# Patient Record
Sex: MALE | Race: WHITE | NOT HISPANIC OR LATINO | Employment: UNEMPLOYED | URBAN - METROPOLITAN AREA
[De-identification: names, ages, dates, MRNs, and addresses within clinical notes are randomized per-mention and may not be internally consistent; named-entity substitution may affect disease eponyms.]

---

## 2020-10-20 ENCOUNTER — TELEPHONE (OUTPATIENT)
Facility: CLINIC | Age: 35
End: 2020-10-20

## 2020-10-20 ENCOUNTER — HOSPITAL ENCOUNTER (INPATIENT)
Facility: CLINIC | Age: 35
LOS: 3 days | Discharge: HOME | End: 2020-10-23
Attending: INTERNAL MEDICINE | Admitting: INTERNAL MEDICINE
Payer: COMMERCIAL

## 2020-10-20 ENCOUNTER — APPOINTMENT (OUTPATIENT)
Dept: GENERAL RADIOLOGY | Facility: CLINIC | Age: 35
End: 2020-10-20
Attending: INTERNAL MEDICINE
Payer: COMMERCIAL

## 2020-10-20 ENCOUNTER — TRANSFERRED RECORDS (OUTPATIENT)
Dept: HEALTH INFORMATION MANAGEMENT | Facility: CLINIC | Age: 35
End: 2020-10-20

## 2020-10-20 PROBLEM — F10.939 ALCOHOL WITHDRAWAL (H): Status: ACTIVE | Noted: 2020-10-20

## 2020-10-20 LAB
ALT SERPL-CCNC: 32 U/L (ref 0–45)
AST SERPL-CCNC: 47 U/L (ref 0–40)
CREAT SERPL-MCNC: 1.31 MG/DL (ref 0.7–1.3)
GFR SERPL CREATININE-BSD FRML MDRD: >60 ML/MIN/1.73M2
GLUCOSE SERPL-MCNC: 152 MG/DL (ref 70–125)
POTASSIUM SERPL-SCNC: 2.5 MMOL/L (ref 3.5–5)

## 2020-10-20 PROCEDURE — 80053 COMPREHEN METABOLIC PANEL: CPT | Performed by: INTERNAL MEDICINE

## 2020-10-20 PROCEDURE — 71045 X-RAY EXAM CHEST 1 VIEW: CPT

## 2020-10-20 PROCEDURE — HZ2ZZZZ DETOXIFICATION SERVICES FOR SUBSTANCE ABUSE TREATMENT: ICD-10-PCS | Performed by: INTERNAL MEDICINE

## 2020-10-20 PROCEDURE — 83735 ASSAY OF MAGNESIUM: CPT | Performed by: INTERNAL MEDICINE

## 2020-10-20 PROCEDURE — 93010 ELECTROCARDIOGRAM REPORT: CPT | Performed by: INTERNAL MEDICINE

## 2020-10-20 PROCEDURE — 36415 COLL VENOUS BLD VENIPUNCTURE: CPT | Performed by: INTERNAL MEDICINE

## 2020-10-20 PROCEDURE — 99223 1ST HOSP IP/OBS HIGH 75: CPT | Mod: AI | Performed by: INTERNAL MEDICINE

## 2020-10-20 PROCEDURE — 999N000157 HC STATISTIC RCP TIME EA 10 MIN

## 2020-10-20 PROCEDURE — 93005 ELECTROCARDIOGRAM TRACING: CPT

## 2020-10-20 PROCEDURE — 120N000001 HC R&B MED SURG/OB

## 2020-10-20 RX ORDER — HALOPERIDOL 5 MG/ML
2.5-5 INJECTION INTRAMUSCULAR EVERY 6 HOURS PRN
Status: DISCONTINUED | OUTPATIENT
Start: 2020-10-20 | End: 2020-10-23 | Stop reason: HOSPADM

## 2020-10-20 RX ORDER — LIDOCAINE 40 MG/G
CREAM TOPICAL
Status: DISCONTINUED | OUTPATIENT
Start: 2020-10-20 | End: 2020-10-23 | Stop reason: HOSPADM

## 2020-10-20 RX ORDER — LORAZEPAM 1 MG/1
1-2 TABLET ORAL EVERY 30 MIN PRN
Status: DISCONTINUED | OUTPATIENT
Start: 2020-10-20 | End: 2020-10-23 | Stop reason: HOSPADM

## 2020-10-20 RX ORDER — LANOLIN ALCOHOL/MO/W.PET/CERES
100 CREAM (GRAM) TOPICAL 3 TIMES DAILY
Status: DISCONTINUED | OUTPATIENT
Start: 2020-10-22 | End: 2020-10-23 | Stop reason: HOSPADM

## 2020-10-20 RX ORDER — POTASSIUM CHLORIDE 1.5 G/1.58G
20-40 POWDER, FOR SOLUTION ORAL
Status: DISCONTINUED | OUTPATIENT
Start: 2020-10-20 | End: 2020-10-23 | Stop reason: HOSPADM

## 2020-10-20 RX ORDER — SODIUM CHLORIDE 9 MG/ML
INJECTION, SOLUTION INTRAVENOUS CONTINUOUS
Status: DISCONTINUED | OUTPATIENT
Start: 2020-10-20 | End: 2020-10-21

## 2020-10-20 RX ORDER — CLONIDINE HYDROCHLORIDE 0.1 MG/1
0.1 TABLET ORAL EVERY 8 HOURS
Status: DISCONTINUED | OUTPATIENT
Start: 2020-10-20 | End: 2020-10-23 | Stop reason: HOSPADM

## 2020-10-20 RX ORDER — ACETAMINOPHEN 650 MG/1
650 SUPPOSITORY RECTAL EVERY 4 HOURS PRN
Status: DISCONTINUED | OUTPATIENT
Start: 2020-10-20 | End: 2020-10-23 | Stop reason: HOSPADM

## 2020-10-20 RX ORDER — NALOXONE HYDROCHLORIDE 0.4 MG/ML
.1-.4 INJECTION, SOLUTION INTRAMUSCULAR; INTRAVENOUS; SUBCUTANEOUS
Status: DISCONTINUED | OUTPATIENT
Start: 2020-10-20 | End: 2020-10-23 | Stop reason: HOSPADM

## 2020-10-20 RX ORDER — LANOLIN ALCOHOL/MO/W.PET/CERES
100 CREAM (GRAM) TOPICAL DAILY
Status: DISCONTINUED | OUTPATIENT
Start: 2020-10-28 | End: 2020-10-23 | Stop reason: HOSPADM

## 2020-10-20 RX ORDER — FOLIC ACID 1 MG/1
1 TABLET ORAL DAILY
Status: DISCONTINUED | OUTPATIENT
Start: 2020-10-21 | End: 2020-10-23 | Stop reason: HOSPADM

## 2020-10-20 RX ORDER — ACETAMINOPHEN 325 MG/1
650 TABLET ORAL EVERY 4 HOURS PRN
Status: DISCONTINUED | OUTPATIENT
Start: 2020-10-20 | End: 2020-10-23 | Stop reason: HOSPADM

## 2020-10-20 RX ORDER — POTASSIUM CHLORIDE 29.8 MG/ML
20 INJECTION INTRAVENOUS
Status: DISCONTINUED | OUTPATIENT
Start: 2020-10-20 | End: 2020-10-23 | Stop reason: HOSPADM

## 2020-10-20 RX ORDER — MULTIPLE VITAMINS W/ MINERALS TAB 9MG-400MCG
1 TAB ORAL DAILY
Status: DISCONTINUED | OUTPATIENT
Start: 2020-10-21 | End: 2020-10-23 | Stop reason: HOSPADM

## 2020-10-20 RX ORDER — LORAZEPAM 2 MG/ML
1-2 INJECTION INTRAMUSCULAR EVERY 30 MIN PRN
Status: DISCONTINUED | OUTPATIENT
Start: 2020-10-20 | End: 2020-10-23 | Stop reason: HOSPADM

## 2020-10-20 RX ORDER — LANOLIN ALCOHOL/MO/W.PET/CERES
200 CREAM (GRAM) TOPICAL 3 TIMES DAILY
Status: COMPLETED | OUTPATIENT
Start: 2020-10-20 | End: 2020-10-22

## 2020-10-20 RX ORDER — FLUMAZENIL 0.1 MG/ML
0.2 INJECTION, SOLUTION INTRAVENOUS
Status: DISCONTINUED | OUTPATIENT
Start: 2020-10-20 | End: 2020-10-23 | Stop reason: HOSPADM

## 2020-10-20 RX ORDER — POTASSIUM CHLORIDE 7.45 MG/ML
10 INJECTION INTRAVENOUS
Status: DISCONTINUED | OUTPATIENT
Start: 2020-10-20 | End: 2020-10-23 | Stop reason: HOSPADM

## 2020-10-20 RX ORDER — POTASSIUM CHLORIDE 1500 MG/1
20-40 TABLET, EXTENDED RELEASE ORAL
Status: DISCONTINUED | OUTPATIENT
Start: 2020-10-20 | End: 2020-10-23 | Stop reason: HOSPADM

## 2020-10-20 RX ORDER — MAGNESIUM SULFATE HEPTAHYDRATE 40 MG/ML
4 INJECTION, SOLUTION INTRAVENOUS EVERY 4 HOURS PRN
Status: DISCONTINUED | OUTPATIENT
Start: 2020-10-20 | End: 2020-10-23 | Stop reason: HOSPADM

## 2020-10-20 RX ORDER — PROCHLORPERAZINE 25 MG
25 SUPPOSITORY, RECTAL RECTAL EVERY 12 HOURS PRN
Status: DISCONTINUED | OUTPATIENT
Start: 2020-10-20 | End: 2020-10-23 | Stop reason: HOSPADM

## 2020-10-20 RX ORDER — OLANZAPINE 5 MG/1
5-10 TABLET, ORALLY DISINTEGRATING ORAL EVERY 6 HOURS PRN
Status: DISCONTINUED | OUTPATIENT
Start: 2020-10-20 | End: 2020-10-23 | Stop reason: HOSPADM

## 2020-10-20 RX ORDER — NITROGLYCERIN 0.4 MG/1
0.4 TABLET SUBLINGUAL EVERY 5 MIN PRN
Status: DISCONTINUED | OUTPATIENT
Start: 2020-10-20 | End: 2020-10-23 | Stop reason: HOSPADM

## 2020-10-20 RX ORDER — LIDOCAINE 40 MG/G
CREAM TOPICAL
Status: DISCONTINUED | OUTPATIENT
Start: 2020-10-20 | End: 2020-10-21

## 2020-10-20 RX ORDER — POTASSIUM CL/LIDO/0.9 % NACL 10MEQ/0.1L
10 INTRAVENOUS SOLUTION, PIGGYBACK (ML) INTRAVENOUS
Status: DISCONTINUED | OUTPATIENT
Start: 2020-10-20 | End: 2020-10-23 | Stop reason: HOSPADM

## 2020-10-20 RX ORDER — PROCHLORPERAZINE MALEATE 5 MG
10 TABLET ORAL EVERY 6 HOURS PRN
Status: DISCONTINUED | OUTPATIENT
Start: 2020-10-20 | End: 2020-10-23 | Stop reason: HOSPADM

## 2020-10-20 ASSESSMENT — MIFFLIN-ST. JEOR: SCORE: 1951.84

## 2020-10-20 NOTE — TELEPHONE ENCOUNTER
3-Hospitalist Huddle Documentation    Acuity/Preferred Bed Type: medical floor  Infection Concerns: none  Additional Specialist Needed Or Specialist Already Contacted:   None  Timely Treatments Needed: No.   Important things to know/address during hospitalization: sitter not needed    Direct admission requested by Dr. Amaro at [facility] ED for etoh withdrawal.     Patient has h istory of alcohol use disorder 1 L daily, stopped abruptly. last drink 3 days prior to [facility].  The patient had a seizure at home on 10/20, drove to a outside clinic for evaluation where he had a witnessed seizure.  He was then referred to the ED for further evaluation, received a dose of Versed per EMS.  While in the ED has received a total of 6 mg Ativan, 2 L NS, in ED without return of seizure activity. He is voluntary.   Tremulous, tachycardic, mildly hypertensive now with VSS.   Labs remarkable for a potassium of 2.5, replaced with 20 meQ IV. Mag of 1.0,  ms, now being replaced. Lactate of 4.1.   NCT head pending.  If head CT clear will contact patient placement accept patient for medical bed, admitting MD, Dr. Mosquera.

## 2020-10-21 LAB
ALBUMIN SERPL-MCNC: 2.6 G/DL (ref 3.4–5)
ALBUMIN SERPL-MCNC: 2.9 G/DL (ref 3.4–5)
ALP SERPL-CCNC: 109 U/L (ref 40–150)
ALP SERPL-CCNC: 96 U/L (ref 40–150)
ALT SERPL W P-5'-P-CCNC: 32 U/L (ref 0–70)
ALT SERPL W P-5'-P-CCNC: 36 U/L (ref 0–70)
ANION GAP SERPL CALCULATED.3IONS-SCNC: 6 MMOL/L (ref 3–14)
ANION GAP SERPL CALCULATED.3IONS-SCNC: 6 MMOL/L (ref 3–14)
AST SERPL W P-5'-P-CCNC: 43 U/L (ref 0–45)
AST SERPL W P-5'-P-CCNC: 50 U/L (ref 0–45)
BILIRUB SERPL-MCNC: 1 MG/DL (ref 0.2–1.3)
BILIRUB SERPL-MCNC: 1.1 MG/DL (ref 0.2–1.3)
BUN SERPL-MCNC: 8 MG/DL (ref 7–30)
BUN SERPL-MCNC: 8 MG/DL (ref 7–30)
CALCIUM SERPL-MCNC: 7.9 MG/DL (ref 8.5–10.1)
CALCIUM SERPL-MCNC: 8.1 MG/DL (ref 8.5–10.1)
CHLORIDE SERPL-SCNC: 93 MMOL/L (ref 94–109)
CHLORIDE SERPL-SCNC: 99 MMOL/L (ref 94–109)
CO2 SERPL-SCNC: 32 MMOL/L (ref 20–32)
CO2 SERPL-SCNC: 35 MMOL/L (ref 20–32)
CREAT SERPL-MCNC: 0.9 MG/DL (ref 0.66–1.25)
CREAT SERPL-MCNC: 1.03 MG/DL (ref 0.66–1.25)
ERYTHROCYTE [DISTWIDTH] IN BLOOD BY AUTOMATED COUNT: 12.9 % (ref 10–15)
GFR SERPL CREATININE-BSD FRML MDRD: >90 ML/MIN/{1.73_M2}
GFR SERPL CREATININE-BSD FRML MDRD: >90 ML/MIN/{1.73_M2}
GLUCOSE SERPL-MCNC: 70 MG/DL (ref 70–99)
GLUCOSE SERPL-MCNC: 87 MG/DL (ref 70–99)
HCT VFR BLD AUTO: 35.3 % (ref 40–53)
HGB BLD-MCNC: 11.9 G/DL (ref 13.3–17.7)
LABORATORY COMMENT REPORT: NORMAL
MAGNESIUM SERPL-MCNC: 2 MG/DL (ref 1.6–2.3)
MAGNESIUM SERPL-MCNC: 2.2 MG/DL (ref 1.6–2.3)
MCH RBC QN AUTO: 33.1 PG (ref 26.5–33)
MCHC RBC AUTO-ENTMCNC: 33.7 G/DL (ref 31.5–36.5)
MCV RBC AUTO: 98 FL (ref 78–100)
PHOSPHATE SERPL-MCNC: 2.4 MG/DL (ref 2.5–4.5)
PLATELET # BLD AUTO: 92 10E9/L (ref 150–450)
POTASSIUM SERPL-SCNC: 2.6 MMOL/L (ref 3.4–5.3)
POTASSIUM SERPL-SCNC: 2.7 MMOL/L (ref 3.4–5.3)
POTASSIUM SERPL-SCNC: 3.5 MMOL/L (ref 3.4–5.3)
PROT SERPL-MCNC: 5.6 G/DL (ref 6.8–8.8)
PROT SERPL-MCNC: 6.4 G/DL (ref 6.8–8.8)
RBC # BLD AUTO: 3.59 10E12/L (ref 4.4–5.9)
SARS-COV-2 RNA SPEC QL NAA+PROBE: NEGATIVE
SARS-COV-2 RNA SPEC QL NAA+PROBE: NORMAL
SODIUM SERPL-SCNC: 134 MMOL/L (ref 133–144)
SODIUM SERPL-SCNC: 137 MMOL/L (ref 133–144)
SPECIMEN SOURCE: NORMAL
SPECIMEN SOURCE: NORMAL
WBC # BLD AUTO: 9.6 10E9/L (ref 4–11)

## 2020-10-21 PROCEDURE — 250N000011 HC RX IP 250 OP 636: Performed by: INTERNAL MEDICINE

## 2020-10-21 PROCEDURE — 258N000003 HC RX IP 258 OP 636: Performed by: INTERNAL MEDICINE

## 2020-10-21 PROCEDURE — 120N000001 HC R&B MED SURG/OB

## 2020-10-21 PROCEDURE — 84132 ASSAY OF SERUM POTASSIUM: CPT | Performed by: INTERNAL MEDICINE

## 2020-10-21 PROCEDURE — 250N000013 HC RX MED GY IP 250 OP 250 PS 637: Performed by: INTERNAL MEDICINE

## 2020-10-21 PROCEDURE — 85027 COMPLETE CBC AUTOMATED: CPT | Performed by: INTERNAL MEDICINE

## 2020-10-21 PROCEDURE — 250N000009 HC RX 250: Performed by: INTERNAL MEDICINE

## 2020-10-21 PROCEDURE — 84100 ASSAY OF PHOSPHORUS: CPT | Performed by: INTERNAL MEDICINE

## 2020-10-21 PROCEDURE — U0003 INFECTIOUS AGENT DETECTION BY NUCLEIC ACID (DNA OR RNA); SEVERE ACUTE RESPIRATORY SYNDROME CORONAVIRUS 2 (SARS-COV-2) (CORONAVIRUS DISEASE [COVID-19]), AMPLIFIED PROBE TECHNIQUE, MAKING USE OF HIGH THROUGHPUT TECHNOLOGIES AS DESCRIBED BY CMS-2020-01-R: HCPCS | Performed by: INTERNAL MEDICINE

## 2020-10-21 PROCEDURE — 99233 SBSQ HOSP IP/OBS HIGH 50: CPT | Performed by: INTERNAL MEDICINE

## 2020-10-21 PROCEDURE — 36415 COLL VENOUS BLD VENIPUNCTURE: CPT | Performed by: INTERNAL MEDICINE

## 2020-10-21 PROCEDURE — 83735 ASSAY OF MAGNESIUM: CPT | Performed by: INTERNAL MEDICINE

## 2020-10-21 PROCEDURE — 80053 COMPREHEN METABOLIC PANEL: CPT | Performed by: INTERNAL MEDICINE

## 2020-10-21 RX ORDER — DEXTROSE MONOHYDRATE, SODIUM CHLORIDE, AND POTASSIUM CHLORIDE 50; 1.49; 4.5 G/1000ML; G/1000ML; G/1000ML
INJECTION, SOLUTION INTRAVENOUS CONTINUOUS
Status: DISCONTINUED | OUTPATIENT
Start: 2020-10-21 | End: 2020-10-22

## 2020-10-21 RX ORDER — GABAPENTIN 100 MG/1
100 CAPSULE ORAL EVERY 8 HOURS
Status: DISCONTINUED | OUTPATIENT
Start: 2020-10-28 | End: 2020-10-23 | Stop reason: HOSPADM

## 2020-10-21 RX ORDER — GABAPENTIN 600 MG/1
1200 TABLET ORAL ONCE
Status: COMPLETED | OUTPATIENT
Start: 2020-10-21 | End: 2020-10-21

## 2020-10-21 RX ORDER — GABAPENTIN 300 MG/1
600 CAPSULE ORAL EVERY 8 HOURS
Status: DISCONTINUED | OUTPATIENT
Start: 2020-10-24 | End: 2020-10-23 | Stop reason: HOSPADM

## 2020-10-21 RX ORDER — GABAPENTIN 300 MG/1
300 CAPSULE ORAL EVERY 8 HOURS
Status: DISCONTINUED | OUTPATIENT
Start: 2020-10-26 | End: 2020-10-23 | Stop reason: HOSPADM

## 2020-10-21 RX ORDER — MAGNESIUM SULFATE HEPTAHYDRATE 40 MG/ML
4 INJECTION, SOLUTION INTRAVENOUS EVERY 4 HOURS PRN
Status: DISCONTINUED | OUTPATIENT
Start: 2020-10-21 | End: 2020-10-21

## 2020-10-21 RX ORDER — GABAPENTIN 300 MG/1
900 CAPSULE ORAL EVERY 8 HOURS
Status: DISCONTINUED | OUTPATIENT
Start: 2020-10-21 | End: 2020-10-22

## 2020-10-21 RX ADMIN — POTASSIUM CHLORIDE, DEXTROSE MONOHYDRATE AND SODIUM CHLORIDE: 150; 5; 450 INJECTION, SOLUTION INTRAVENOUS at 13:48

## 2020-10-21 RX ADMIN — CLONIDINE HYDROCHLORIDE 0.1 MG: 0.1 TABLET ORAL at 00:49

## 2020-10-21 RX ADMIN — GABAPENTIN 900 MG: 300 CAPSULE ORAL at 09:25

## 2020-10-21 RX ADMIN — SODIUM CHLORIDE: 9 INJECTION, SOLUTION INTRAVENOUS at 09:35

## 2020-10-21 RX ADMIN — THIAMINE HCL TAB 100 MG 200 MG: 100 TAB at 09:27

## 2020-10-21 RX ADMIN — THIAMINE HCL TAB 100 MG 200 MG: 100 TAB at 16:46

## 2020-10-21 RX ADMIN — Medication 10 MEQ: at 13:44

## 2020-10-21 RX ADMIN — Medication 10 MEQ: at 14:49

## 2020-10-21 RX ADMIN — POTASSIUM PHOSPHATE, MONOBASIC AND POTASSIUM PHOSPHATE, DIBASIC 15 MMOL: 224; 236 INJECTION, SOLUTION, CONCENTRATE INTRAVENOUS at 18:59

## 2020-10-21 RX ADMIN — THIAMINE HCL TAB 100 MG 200 MG: 100 TAB at 22:02

## 2020-10-21 RX ADMIN — Medication 10 MEQ: at 12:52

## 2020-10-21 RX ADMIN — POTASSIUM CHLORIDE 40 MEQ: 1500 TABLET, EXTENDED RELEASE ORAL at 00:49

## 2020-10-21 RX ADMIN — LORAZEPAM 1 MG: 2 INJECTION INTRAMUSCULAR; INTRAVENOUS at 00:43

## 2020-10-21 RX ADMIN — LORAZEPAM 2 MG: 2 INJECTION INTRAMUSCULAR; INTRAVENOUS at 03:13

## 2020-10-21 RX ADMIN — CLONIDINE HYDROCHLORIDE 0.1 MG: 0.1 TABLET ORAL at 09:28

## 2020-10-21 RX ADMIN — THIAMINE HCL TAB 100 MG 200 MG: 100 TAB at 00:49

## 2020-10-21 RX ADMIN — MULTIPLE VITAMINS W/ MINERALS TAB 1 TABLET: TAB at 09:27

## 2020-10-21 RX ADMIN — LORAZEPAM 2 MG: 2 INJECTION INTRAMUSCULAR; INTRAVENOUS at 02:05

## 2020-10-21 RX ADMIN — CLONIDINE HYDROCHLORIDE 0.1 MG: 0.1 TABLET ORAL at 16:46

## 2020-10-21 RX ADMIN — POTASSIUM CHLORIDE 40 MEQ: 1500 TABLET, EXTENDED RELEASE ORAL at 05:07

## 2020-10-21 RX ADMIN — GABAPENTIN 1200 MG: 600 TABLET, FILM COATED ORAL at 01:41

## 2020-10-21 RX ADMIN — LORAZEPAM 2 MG: 2 INJECTION INTRAMUSCULAR; INTRAVENOUS at 02:40

## 2020-10-21 RX ADMIN — Medication 10 MEQ: at 16:53

## 2020-10-21 RX ADMIN — LORAZEPAM 2 MG: 2 INJECTION INTRAMUSCULAR; INTRAVENOUS at 03:50

## 2020-10-21 RX ADMIN — FOLIC ACID 1 MG: 1 TABLET ORAL at 09:27

## 2020-10-21 RX ADMIN — CLONIDINE HYDROCHLORIDE 0.1 MG: 0.1 TABLET ORAL at 22:03

## 2020-10-21 RX ADMIN — LORAZEPAM 1 MG: 2 INJECTION INTRAMUSCULAR; INTRAVENOUS at 04:51

## 2020-10-21 RX ADMIN — Medication 10 MEQ: at 11:51

## 2020-10-21 RX ADMIN — LORAZEPAM 2 MG: 2 INJECTION INTRAMUSCULAR; INTRAVENOUS at 06:02

## 2020-10-21 RX ADMIN — GABAPENTIN 900 MG: 300 CAPSULE ORAL at 16:46

## 2020-10-21 RX ADMIN — Medication 10 MEQ: at 15:55

## 2020-10-21 RX ADMIN — SODIUM CHLORIDE: 9 INJECTION, SOLUTION INTRAVENOUS at 01:06

## 2020-10-21 ASSESSMENT — ACTIVITIES OF DAILY LIVING (ADL)
ADLS_ACUITY_SCORE: 18
ADLS_ACUITY_SCORE: 22
ADLS_ACUITY_SCORE: 18
ADLS_ACUITY_SCORE: 24

## 2020-10-21 NOTE — PROGRESS NOTES
[facility]  Hospitalist Progress Note  David Guzman MD 10/21/2020    Reason for Stay        Alcohol withdrawal seizures    Alcohol withdrawal syndrome    Hypokalemia    Thrombocytopenia         Assessment and Plan:        Summary of Stay:     Nacho Downey is a 35 year old male admitted on 10/20/2020. He has reported past medical history significant for alcohol dependence with ongoing abuse and tobacco use disorder.  He had presented to an outside emergency room due to reported seizure activity.  Patient was found to have acute alcohol withdrawals.  Suspect that seizure activity would have been related to alcohol withdrawals.        Patient progress during stay:    Patient was admitted and was closely monitored.  Electrolytes replaced.  He was treated with as needed Ativan as well as clonidine and gabapentin for alcohol withdrawal treatment protocol.  No recurrence of seizure with patient remained encephalopathic and required close monitoring.        Problem List with Assessment and Plan      1. Alcohol use disorder with acute alcohol withdrawal syndrome and  seizure    No seizure episode documented.  Patient remained encephalopathic but improving.  Continue current medications including clonidine, gabapentin and as needed Ativan.  Avoid excessive Ativan use if he sedated.    Monitor for agitation and may need ICU transfer if he develops significant alcohol withdrawal symptoms    Continue multivitamins    2. Hypokalemia: Replace per protocol.  Magnesium and phosphorus protocol added    3. Tobacco use disorder: Nicotine gum as needed  4. Acute encephalopathy: Secondary to #1 and benzodiazepines.  Continue to monitor mental status and avoid excessive sedation.    5.  SARS-CoV-2 PCR came back negative: May discontinue Covid isolation        Plan for today :    May discontinue Covid isolation    Continue alcohol withdrawal treatment protocol.    Monitor for seizure activity        LDA  "    Access : Peripheral     Cotter Catheter: not present        FEN :    Orders Placed This Encounter      Combination Diet Regular Diet Adult           Intake/Output Summary (Last 24 hours) at 10/21/2020 1653  Last data filed at 10/21/2020 1452  Gross per 24 hour   Intake 1203 ml   Output 1750 ml   Net -547 ml          DVT Prophylaxis: Enoxaparin (Lovenox) SQ    Code Status:  Full Code    Estimated discharge  disposition and plan:  May discharge  to home  in 1-2 day(s) if patient remains stable           Interval History (Subjective):        Patient is seen and examined by me today and medical record reviewed.Overnight events noted and care discussed with nursing staff.  Patient is somnolent but arousable.  No seizure activity documented.  No fever or chills.  Patient was agitated last night requiring 5 mg of Ativan.                  Physical Exam:        Last Vital Signs:  BP (!) 136/103 (BP Location: Right arm)   Pulse 68   Temp 98.5  F (36.9  C) (Axillary)   Resp 20   Ht 1.93 m (6' 4\")   Wt 92.5 kg (204 lb)   SpO2 96%   BMI 24.83 kg/m      I/O last 3 completed shifts:  In: 1203 [P.O.:50; I.V.:1153]  Out: 1750 [Urine:1750]    Wt Readings from Last 5 Encounters:   10/20/20 92.5 kg (204 lb)        Constitutional:  Patient is somnolent but arousable.  No acute distress noted.     Respiratory: Clear to auscultation bilaterally, no crackles or wheezing   Cardiovascular: Regular rate and rhythm, normal S1 and S2, and no murmur noted   Abdomen: Normal bowel sounds, soft, non-distended, non-tender   Skin: No rashes, no cyanosis, dry to touch   Neuro:  Somnolent, nonfocal on exam.   Extremities: No edema, normal range of motion   Other(s):        All other systems: Negative          Medications:        All current medications were reviewed with changes reflected in problem list.         Data:      All new lab and imaging data was reviewed.      Data reviewed today: I reviewed all new labs and imaging results over " the last 24 hours. I personally reviewed no images or EKG's today      Recent Labs   Lab 10/21/20  0702   WBC 9.6   HGB 11.9*   HCT 35.3*   MCV 98   PLT 92*     No results for input(s): CULT in the last 168 hours.  Recent Labs   Lab 10/21/20  0702 10/20/20  2340    134   POTASSIUM 2.7* 2.6*   CHLORIDE 99 93*   CO2 32 35*   ANIONGAP 6 6   GLC 87 70   BUN 8 8   CR 0.90 1.03   GFRESTIMATED >90 >90   GFRESTBLACK >90 >90   CECE 7.9* 8.1*   MAG 2.0 2.2   PHOS 2.4*  --    PROTTOTAL 5.6* 6.4*   ALBUMIN 2.6* 2.9*   BILITOTAL 1.0 1.1   ALKPHOS 96 109   AST 43 50*   ALT 32 36       Recent Labs   Lab 10/21/20  0702 10/20/20  2340   GLC 87 70       No results for input(s): INR in the last 168 hours.      No results for input(s): TROPONIN, TROPI, TROPR in the last 168 hours.    Invalid input(s): TROP, TROPONINIES    Recent Results (from the past 48 hour(s))   XR Chest Port 1 View    Narrative    EXAM: XR CHEST PORT 1 VW  LOCATION: [facility]  DATE/TIME: 10/20/2020 10:56 PM    INDICATION: Cough  COMPARISON: 10/20/2020      Impression    IMPRESSION: Negative chest.       COVID Status:  COVID-19 PCR Results    COVID-19 PCR Results 10/21/20 10/21/20    0210 0210   COVID-19 Virus PCR to [facility] - Result Test received-See reflex to IDDL test SARS CoV2 (COVID-19) Virus RT-PCR    COVID-19 Virus PCR to [facility] - Source Nasopharyngeal    SARS-CoV-2 Virus Specimen Source  Nasopharyngeal   SARS-CoV-2 PCR Result  NEGATIVE      Comments are available for some flowsheets but are not being displayed.         COVID-19 Antibody Results, Testing for Immunity    COVID-19 Antibody Results, Testing for Immunity   No data to display.              Disclaimer: This note consists of symbols derived from keyboarding, dictation and/or voice recognition software. As a result, there may be errors in the script that have gone undetected. Please consider this when interpreting information found in this chart.

## 2020-10-21 NOTE — PLAN OF CARE
Pt was direct admit from [facility] at 2141. Disorientated to time, restless and forgetful.  Denies pain.  VSS on RA.  R PIV SL x2.  L hand PIV SL.  LS clear, denies SOB.  Tele: SR, denies CP.  Seizure precautions initiated.  Discharge tbd.  Continue POC.

## 2020-10-21 NOTE — PLAN OF CARE
Since bedside report with noc RN - sternal rub for pt to open eyes, groan. CIWA 3/3 for confusion due to lethargy. No ativan given. O2 2L  RR 16-18. Incontinent of urine. Able to take po medications. K+ 2.7replacement (IV) initiated. Multiple bruises, L arm scab noted. Platelets 92. Tele NSR. IVF No seizure activity, emergency equipment in room. PSC.

## 2020-10-21 NOTE — PROGRESS NOTES
Cross Cx:     Patient has increasing agitation with increasing CIWA scores. Given ativan 2 mg IV prior to calling nursing. Sitter at bedside. Recent admission with total ativan given 5 mg IV at this time. Since ativan recently given await to see if clinically responds. If continuing to have high CIWA scores/agitation notify physician for evaluation.

## 2020-10-21 NOTE — H&P
[facility]    History and Physical - Hospitalist Service       Date of Admission:  10/20/2020    Assessment & Plan   Nacho Downey is a 35 year old male admitted on 10/20/2020. He has reported past medical history significant for alcohol dependence with ongoing abuse and tobacco use disorder.  He had presented to an outside emergency room due to reported seizure activity.  Does appear to be in acute alcohol withdrawals.  Suspect that seizure activity would have been related to alcohol withdrawals.    1.  Reported seizure activity.  Seizure precautions. Treat alcohol withdrawals.  Check chest x-ray.    2.  Acute alcohol withdrawals.  Check CMP level now.  Start clonidine 0.1 mg every 8 hours.  If creatinine allows, would also start gabapentin.  Have lorazepam available if needed.  Start continuous IV fluids.    3.  Alcohol dependence with ongoing abuse.  Likely needs to see chemical dependency once more able to interact.    4.  Tobacco use disorder.  Have nicotine gum available if needed.  I did advise smoking cessation, but this should be readdressed when he is more able to interact.    5.  Reported hypokalemia.  Recheck labs now.  Start potassium replacement protocol.     Diet: Combination Diet Regular Diet Adult    DVT Prophylaxis: Pneumatic Compression Devices  Cotter Catheter: not present  Code Status: Full Code    Rule Out COVID-19 Handoff:  Nacho is a LOW SUSPICION PUI.  Follow these instructions:    If COVID test positive -> continue isolation precautions    If COVID test negative -> discontinue COVID-specific isolation precautions       Disposition Plan   Expected discharge: 4 - 7 days    Juancho Mosquera DO  [facility]  Contact information available via [facility] Paging/Directory      ______________________________________________________________________    Chief Complaint   Seizure.    History is obtained from the patient    History of Present Illness   Nacho MAYERS  Shayan is a 35 year old male who has a history of alcohol abuse and tobacco use disorder.  He had presented to an outside facility after developing seizure activity today.  He is currently in obvious alcohol withdrawals and a very poor medical historian.  It is very difficult to determine many details of his story.  He is able to tell me that he has had seizures previously.  Most recently before today was 2 weeks ago.  He also is able to tell me that he usually drinks 1 L of vodka per day and additional beer.  He does usually smoke 1 pack of cigarettes a day.  Quit smoking 4 days ago.  Has been having some nausea.  Not eating or drinking well.  Difficult to tell if he has been having a cough or shortness of breath.  Also does not give me a clear answer on when his last bowel movement was.  No other acute complaints at this time.    Review of Systems    The 10 point Review of Systems is negative other than noted in the HPI.  This should be considered somewhat unreliable due to patient's current mental status.    Past Medical History    I have reviewed this patient's medical history and updated it with pertinent information if needed.   No past medical history on file.    Past Surgical History   I have reviewed this patient's surgical history and updated it with pertinent information if needed.  No past surgical history on file.    Social History   I have reviewed this patient's social history and updated it with pertinent information if needed.  Social History     Tobacco Use     Smoking status: Not on file   Substance Use Topics     Alcohol use: Not on file     Drug use: Not on file       Family History     He is not able to tell me of any significant medical problems in the immediate family.    Prior to Admission Medications   None     Allergies   Not on File    Physical Exam   Vital Signs: Temp: 98.7  F (37.1  C) Temp src: Oral BP: (!) 128/94 Pulse: 91   Resp: 18 SpO2: 93 % O2 Device: None (Room air)    Weight:  204 lbs 0 oz    Gen: Appears to be in obvious alcohol withdrawal.  Is tremulous.  Has quite a bit of difficulty staying on topic.  Seems oriented to self.  Difficult to tell if he is oriented to place and time.  Eyes:  PERRL, sclera anicteric.  OP:  MMM, no lesions.  Neck:  Supple.  CV:  Regular, no murmurs.  Lung:  CTA b/l, normal effort.  Ab:  +BS, soft.  Skin:  Warm, dry to touch.  No rash.  Ext:  No pitting edema LE b/l.

## 2020-10-21 NOTE — PLAN OF CARE
Pt Ax2 disorientated to location and date. Fall at approximately 0000, pt believed he saw his dog. Picked self up off the floor. No injuries noted. Provider notified. Initial vital signs stable on RA. At approximately 0700, pt's saturation dropped to 88, initiated 2LPM via NC, per order. Orientation inconsistent. CIWA scores: 10, 7, 14, 17, 20, 245, 8, 17. Received a total of 12 mg. IV lorazepam. Sitter at bedside. IV running NS at 125ml/hr.

## 2020-10-22 ENCOUNTER — COMMUNICATION - HEALTHEAST (OUTPATIENT)
Dept: SCHEDULING | Facility: CLINIC | Age: 35
End: 2020-10-22

## 2020-10-22 LAB
ALBUMIN SERPL-MCNC: 2.5 G/DL (ref 3.4–5)
ALP SERPL-CCNC: 92 U/L (ref 40–150)
ALT SERPL W P-5'-P-CCNC: 31 U/L (ref 0–70)
ANION GAP SERPL CALCULATED.3IONS-SCNC: 6 MMOL/L (ref 3–14)
AST SERPL W P-5'-P-CCNC: 30 U/L (ref 0–45)
BILIRUB SERPL-MCNC: 1.2 MG/DL (ref 0.2–1.3)
BUN SERPL-MCNC: 6 MG/DL (ref 7–30)
CALCIUM SERPL-MCNC: 8.1 MG/DL (ref 8.5–10.1)
CHLORIDE SERPL-SCNC: 103 MMOL/L (ref 94–109)
CO2 SERPL-SCNC: 28 MMOL/L (ref 20–32)
CREAT SERPL-MCNC: 0.75 MG/DL (ref 0.66–1.25)
ERYTHROCYTE [DISTWIDTH] IN BLOOD BY AUTOMATED COUNT: 12.7 % (ref 10–15)
GFR SERPL CREATININE-BSD FRML MDRD: >90 ML/MIN/{1.73_M2}
GLUCOSE SERPL-MCNC: 94 MG/DL (ref 70–99)
HCT VFR BLD AUTO: 36 % (ref 40–53)
HGB BLD-MCNC: 11.8 G/DL (ref 13.3–17.7)
INTERPRETATION ECG - MUSE: NORMAL
MAGNESIUM SERPL-MCNC: 1.7 MG/DL (ref 1.6–2.3)
MCH RBC QN AUTO: 33.1 PG (ref 26.5–33)
MCHC RBC AUTO-ENTMCNC: 32.8 G/DL (ref 31.5–36.5)
MCV RBC AUTO: 101 FL (ref 78–100)
PHOSPHATE SERPL-MCNC: 2.3 MG/DL (ref 2.5–4.5)
PLATELET # BLD AUTO: 75 10E9/L (ref 150–450)
POTASSIUM SERPL-SCNC: 3.3 MMOL/L (ref 3.4–5.3)
POTASSIUM SERPL-SCNC: 4.1 MMOL/L (ref 3.4–5.3)
PROT SERPL-MCNC: 5.7 G/DL (ref 6.8–8.8)
RBC # BLD AUTO: 3.56 10E12/L (ref 4.4–5.9)
SODIUM SERPL-SCNC: 137 MMOL/L (ref 133–144)
WBC # BLD AUTO: 13 10E9/L (ref 4–11)

## 2020-10-22 PROCEDURE — 84132 ASSAY OF SERUM POTASSIUM: CPT | Performed by: INTERNAL MEDICINE

## 2020-10-22 PROCEDURE — 85027 COMPLETE CBC AUTOMATED: CPT | Performed by: INTERNAL MEDICINE

## 2020-10-22 PROCEDURE — 83735 ASSAY OF MAGNESIUM: CPT | Performed by: INTERNAL MEDICINE

## 2020-10-22 PROCEDURE — 84100 ASSAY OF PHOSPHORUS: CPT | Performed by: INTERNAL MEDICINE

## 2020-10-22 PROCEDURE — 120N000001 HC R&B MED SURG/OB

## 2020-10-22 PROCEDURE — 250N000013 HC RX MED GY IP 250 OP 250 PS 637: Performed by: INTERNAL MEDICINE

## 2020-10-22 PROCEDURE — 250N000009 HC RX 250: Performed by: INTERNAL MEDICINE

## 2020-10-22 PROCEDURE — 99233 SBSQ HOSP IP/OBS HIGH 50: CPT | Performed by: INTERNAL MEDICINE

## 2020-10-22 PROCEDURE — 80053 COMPREHEN METABOLIC PANEL: CPT | Performed by: INTERNAL MEDICINE

## 2020-10-22 PROCEDURE — 36415 COLL VENOUS BLD VENIPUNCTURE: CPT | Performed by: INTERNAL MEDICINE

## 2020-10-22 PROCEDURE — 258N000003 HC RX IP 258 OP 636: Performed by: INTERNAL MEDICINE

## 2020-10-22 RX ORDER — GABAPENTIN 300 MG/1
300 CAPSULE ORAL EVERY 8 HOURS
Status: DISCONTINUED | OUTPATIENT
Start: 2020-10-22 | End: 2020-10-23 | Stop reason: HOSPADM

## 2020-10-22 RX ADMIN — CLONIDINE HYDROCHLORIDE 0.1 MG: 0.1 TABLET ORAL at 23:55

## 2020-10-22 RX ADMIN — MULTIPLE VITAMINS W/ MINERALS TAB 1 TABLET: TAB at 09:21

## 2020-10-22 RX ADMIN — POTASSIUM CHLORIDE 40 MEQ: 1.5 POWDER, FOR SOLUTION ORAL at 09:30

## 2020-10-22 RX ADMIN — THIAMINE HCL TAB 100 MG 100 MG: 100 TAB at 22:10

## 2020-10-22 RX ADMIN — CLONIDINE HYDROCHLORIDE 0.1 MG: 0.1 TABLET ORAL at 09:28

## 2020-10-22 RX ADMIN — POTASSIUM PHOSPHATE, MONOBASIC AND POTASSIUM PHOSPHATE, DIBASIC 15 MMOL: 224; 236 INJECTION, SOLUTION, CONCENTRATE INTRAVENOUS at 12:05

## 2020-10-22 RX ADMIN — GABAPENTIN 900 MG: 300 CAPSULE ORAL at 09:21

## 2020-10-22 RX ADMIN — FOLIC ACID 1 MG: 1 TABLET ORAL at 09:21

## 2020-10-22 RX ADMIN — CLONIDINE HYDROCHLORIDE 0.1 MG: 0.1 TABLET ORAL at 16:12

## 2020-10-22 RX ADMIN — THIAMINE HCL TAB 100 MG 200 MG: 100 TAB at 16:12

## 2020-10-22 RX ADMIN — POTASSIUM CHLORIDE, DEXTROSE MONOHYDRATE AND SODIUM CHLORIDE: 150; 5; 450 INJECTION, SOLUTION INTRAVENOUS at 00:16

## 2020-10-22 RX ADMIN — LORAZEPAM 1 MG: 1 TABLET ORAL at 00:57

## 2020-10-22 RX ADMIN — THIAMINE HCL TAB 100 MG 200 MG: 100 TAB at 09:26

## 2020-10-22 RX ADMIN — GABAPENTIN 300 MG: 300 CAPSULE ORAL at 17:59

## 2020-10-22 RX ADMIN — GABAPENTIN 900 MG: 300 CAPSULE ORAL at 00:57

## 2020-10-22 ASSESSMENT — ACTIVITIES OF DAILY LIVING (ADL)
ADLS_ACUITY_SCORE: 22
ADLS_ACUITY_SCORE: 19
ADLS_ACUITY_SCORE: 22
ADLS_ACUITY_SCORE: 19

## 2020-10-22 NOTE — PROVIDER NOTIFICATION
"Dr. Guzman paged regarding the pt still lethargic w/no ativan given since admit. Pt feels \"tired and blah\". RN writer web messaged Dr. Guzman questioning if gabapentin dose could be decreased?     1606 order received/verified - reduced gabapentin dose to 300mg.  "

## 2020-10-22 NOTE — PLAN OF CARE
"No seizure activity. CIWA scores 4/3. No ativan given. Quick/impulsive to get OOB/Bathroom, assist of 2, gait belt and pt still unsteady. Incontinent and continent of bladder. Confused to year/date this morning. More alert and oriented this afternoon. PO intake good. IVF maintenance discontinued. Phos & K+ replaced. RL forearm IV site pink, monitoring. New IV sites placed L arm. Tele NSR. Pt agreeable to stay overnoc, possible discharge tomorrow.     Addendum 1545: Telemetry strips noted SB 48-49bpm when sleeping. Peaked T waves. Pt awakens and is oriented but is still very lethargic. He stated that he feels \"tired/blah\". No ativan given since around midnoc.   "

## 2020-10-22 NOTE — PHARMACY-ADMISSION MEDICATION HISTORY
Admission medication history interview status for this patient is complete. See [facility] admission navigator for allergy information, prior to admission medications and immunization status.     Medication history interview done via telephone during Covid-19 pandemic, indicate source(s): Patient  Medication history resources (including written lists, pill bottles, clinic record):None  Pharmacy: none    Changes made to PTA medication list:  Added: all  Deleted: none  Changed: none    Actions taken by pharmacist (provider contacted, etc):None     Additional medication history information: stopped taking zoloft, not taking prn trazodone. The only meds pta: occasional Ibuprofen and OTC eye gtts for dry eyes.     Medication reconciliation/reorder completed by provider prior to medication history?  no   (Y/N)     For patients on insulin therapy: no     Prior to Admission medications    Medication Sig Last Dose Taking? Auth Provider   Carboxymethylcellulose Sodium (ARTIFICIAL TEARS OP) Apply to eye as needed (dry eyes) OTC prn Yes Unknown, Entered By History   IBUPROFEN PO Take by mouth as needed for moderate pain prn Yes Unknown, Entered By History

## 2020-10-22 NOTE — PLAN OF CARE
Patient alert to self and time only. Denies any pain. CIWAs have been 4-8-1, 1mg PO ativan given since 7pm last night. Sitter at bedside for safety, as patient is restless and pulling at lines. Incontinent at times, sometimes uses the urinal. Wanted to stand, patient is very unsteady and weak. VSS. K/Mag/Phos protocols in place, will recheck all this morning. Swallows pills without issues. Tele is SR. Continue with current plan of care.

## 2020-10-22 NOTE — PROGRESS NOTES
[facility]  Hospitalist Progress Note  David Guzman MD 10/22/2020    Reason for Stay        Alcohol withdrawal seizures    Alcohol withdrawal syndrome    Hypokalemia    Thrombocytopenia         Assessment and Plan:        Summary of Stay:     Nacho Downey is a 35 year old male admitted on 10/20/2020. He has reported past medical history significant for alcohol dependence with ongoing abuse and tobacco use disorder.  He had presented to an outside emergency room due to reported seizure activity.  Patient was found to have acute alcohol withdrawals.  Suspect that seizure activity would have been related to alcohol withdrawals.        Patient progress during stay:    Patient was admitted and was closely monitored.  Electrolytes replaced.  He was treated with as needed Ativan as well as clonidine and gabapentin for alcohol withdrawal treatment protocol.  No recurrence of seizure with patient remained encephalopathic and required close monitoring.        Problem List with Assessment and Plan      1. Alcohol use disorder with acute alcohol withdrawal syndrome and  seizure    No seizure episode documented.      Mental status much better     Continue current medications.    Continue multivitamins    2. Hypokalemia: Replace per protocol.  Magnesium and phosphorus protocol added    3. Tobacco use disorder: Nicotine gum as needed  4. Acute encephalopathy: Secondary to #1 and benzodiazepines.  Continue to monitor mental status and avoid excessive sedation.    5.  SARS-CoV-2 PCR came back negative: May discontinue Covid isolation        Plan for today :    Continue to monitor         LDA     Access : Peripheral     Cotter Catheter: not present        FEN :    Orders Placed This Encounter      Combination Diet Regular Diet Adult           Intake/Output Summary (Last 24 hours) at 10/21/2020 1653  Last data filed at 10/21/2020 1452  Gross per 24 hour   Intake 1203 ml   Output 1750 ml   Net -547 ml     "      DVT Prophylaxis: Enoxaparin (Lovenox) SQ    Code Status:  Full Code    Estimated discharge  disposition and plan:  May discharge  to home  Tomorrow  if patient remains stable           Interval History (Subjective):        Patient is seen and examined by me today and medical record reviewed.Overnight events noted and care discussed with nursing staff.  Much better. Alert and communicating   occasional hallucination   Sitter at bed side                   Physical Exam:        Last Vital Signs:  BP (!) 121/92 (BP Location: Right arm)   Pulse 63   Temp 98.4  F (36.9  C) (Oral)   Resp 16   Ht 1.93 m (6' 4\")   Wt 92.5 kg (204 lb)   SpO2 96%   BMI 24.83 kg/m      I/O last 3 completed shifts:  In: 2330 [P.O.:530; I.V.:1800]  Out: 2475 [Urine:2475]    Wt Readings from Last 5 Encounters:   10/20/20 92.5 kg (204 lb)        Constitutional:  alert and oriented     Respiratory: Clear to auscultation bilaterally, no crackles or wheezing   Cardiovascular: Regular rate and rhythm, normal S1 and S2, and no murmur noted   Abdomen: Normal bowel sounds, soft, non-distended, non-tender   Skin: No rashes, no cyanosis, dry to touch   Neuro:  Somnolent, nonfocal on exam.   Extremities: No edema, normal range of motion   Other(s):        All other systems: Negative          Medications:        All current medications were reviewed with changes reflected in problem list.         Data:      All new lab and imaging data was reviewed.      Data reviewed today: I reviewed all new labs and imaging results over the last 24 hours. I personally reviewed no images or EKG's today      Recent Labs   Lab 10/22/20  0624 10/21/20  0702   WBC 13.0* 9.6   HGB 11.8* 11.9*   HCT 36.0* 35.3*   * 98   PLT 75* 92*     No results for input(s): CULT in the last 168 hours.  Recent Labs   Lab 10/22/20  0624 10/21/20  2020 10/21/20  0702 10/20/20  2340     --  137 134   POTASSIUM 3.3* 3.5 2.7* 2.6*   CHLORIDE 103  --  99 93*   CO2 28  --  32 " 35*   ANIONGAP 6  --  6 6   GLC 94  --  87 70   BUN 6*  --  8 8   CR 0.75  --  0.90 1.03   GFRESTIMATED >90  --  >90 >90   GFRESTBLACK >90  --  >90 >90   CECE 8.1*  --  7.9* 8.1*   MAG 1.7  --  2.0 2.2   PHOS 2.3*  --  2.4*  --    PROTTOTAL 5.7*  --  5.6* 6.4*   ALBUMIN 2.5*  --  2.6* 2.9*   BILITOTAL 1.2  --  1.0 1.1   ALKPHOS 92  --  96 109   AST 30  --  43 50*   ALT 31  --  32 36       Recent Labs   Lab 10/22/20  0624 10/21/20  0702 10/20/20  2340   GLC 94 87 70       No results for input(s): INR in the last 168 hours.      No results for input(s): TROPONIN, TROPI, TROPR in the last 168 hours.    Invalid input(s): TROP, TROPONINIES    Recent Results (from the past 48 hour(s))   XR Chest Port 1 View    Narrative    EXAM: XR CHEST PORT 1 VW  LOCATION: [facility]  DATE/TIME: 10/20/2020 10:56 PM    INDICATION: Cough  COMPARISON: 10/20/2020      Impression    IMPRESSION: Negative chest.       COVID Status:  COVID-19 PCR Results    COVID-19 PCR Results 10/21/20 10/21/20    0210 0210   COVID-19 Virus PCR to [facility] - Result Test received-See reflex to IDDL test SARS CoV2 (COVID-19) Virus RT-PCR    COVID-19 Virus PCR to [facility] - Source Nasopharyngeal    SARS-CoV-2 Virus Specimen Source  Nasopharyngeal   SARS-CoV-2 PCR Result  NEGATIVE      Comments are available for some flowsheets but are not being displayed.         COVID-19 Antibody Results, Testing for Immunity    COVID-19 Antibody Results, Testing for Immunity   No data to display.              Disclaimer: This note consists of symbols derived from keyboarding, dictation and/or voice recognition software. As a result, there may be errors in the script that have gone undetected. Please consider this when interpreting information found in this chart.

## 2020-10-23 ENCOUNTER — APPOINTMENT (OUTPATIENT)
Dept: GENERAL RADIOLOGY | Facility: CLINIC | Age: 35
End: 2020-10-23
Attending: INTERNAL MEDICINE
Payer: COMMERCIAL

## 2020-10-23 VITALS
OXYGEN SATURATION: 94 % | HEART RATE: 70 BPM | TEMPERATURE: 98.7 F | WEIGHT: 204 LBS | HEIGHT: 76 IN | SYSTOLIC BLOOD PRESSURE: 127 MMHG | RESPIRATION RATE: 16 BRPM | BODY MASS INDEX: 24.84 KG/M2 | DIASTOLIC BLOOD PRESSURE: 95 MMHG

## 2020-10-23 LAB — PHOSPHATE SERPL-MCNC: 3 MG/DL (ref 2.5–4.5)

## 2020-10-23 PROCEDURE — 36415 COLL VENOUS BLD VENIPUNCTURE: CPT | Performed by: INTERNAL MEDICINE

## 2020-10-23 PROCEDURE — G0008 ADMIN INFLUENZA VIRUS VAC: HCPCS | Performed by: INTERNAL MEDICINE

## 2020-10-23 PROCEDURE — 99238 HOSP IP/OBS DSCHRG MGMT 30/<: CPT | Performed by: INTERNAL MEDICINE

## 2020-10-23 PROCEDURE — 99207 PR CDG-CODE INCORRECT PER BILLING BASED ON TIME: CPT | Performed by: INTERNAL MEDICINE

## 2020-10-23 PROCEDURE — 73030 X-RAY EXAM OF SHOULDER: CPT | Mod: RT

## 2020-10-23 PROCEDURE — 250N000011 HC RX IP 250 OP 636: Performed by: INTERNAL MEDICINE

## 2020-10-23 PROCEDURE — 250N000013 HC RX MED GY IP 250 OP 250 PS 637: Performed by: INTERNAL MEDICINE

## 2020-10-23 PROCEDURE — 84100 ASSAY OF PHOSPHORUS: CPT | Performed by: INTERNAL MEDICINE

## 2020-10-23 PROCEDURE — 90686 IIV4 VACC NO PRSV 0.5 ML IM: CPT | Performed by: INTERNAL MEDICINE

## 2020-10-23 RX ADMIN — INFLUENZA A VIRUS A/GUANGDONG-MAONAN/SWL1536/2019 CNIC-1909 (H1N1) ANTIGEN (FORMALDEHYDE INACTIVATED), INFLUENZA A VIRUS A/HONG KONG/2671/2019 (H3N2) ANTIGEN (FORMALDEHYDE INACTIVATED), INFLUENZA B VIRUS B/PHUKET/3073/2013 ANTIGEN (FORMALDEHYDE INACTIVATED), AND INFLUENZA B VIRUS B/WASHINGTON/02/2019 ANTIGEN (FORMALDEHYDE INACTIVATED) 0.5 ML: 15; 15; 15; 15 INJECTION, SUSPENSION INTRAMUSCULAR at 11:31

## 2020-10-23 RX ADMIN — ACETAMINOPHEN 650 MG: 325 TABLET, FILM COATED ORAL at 08:51

## 2020-10-23 RX ADMIN — GABAPENTIN 300 MG: 300 CAPSULE ORAL at 08:42

## 2020-10-23 RX ADMIN — FOLIC ACID 1 MG: 1 TABLET ORAL at 08:41

## 2020-10-23 RX ADMIN — GABAPENTIN 300 MG: 300 CAPSULE ORAL at 01:26

## 2020-10-23 RX ADMIN — THIAMINE HCL TAB 100 MG 100 MG: 100 TAB at 08:41

## 2020-10-23 RX ADMIN — CLONIDINE HYDROCHLORIDE 0.1 MG: 0.1 TABLET ORAL at 08:41

## 2020-10-23 RX ADMIN — MULTIPLE VITAMINS W/ MINERALS TAB 1 TABLET: TAB at 08:41

## 2020-10-23 ASSESSMENT — ACTIVITIES OF DAILY LIVING (ADL)
ADLS_ACUITY_SCORE: 19
ADLS_ACUITY_SCORE: 16
ADLS_ACUITY_SCORE: 12
ADLS_ACUITY_SCORE: 19

## 2020-10-23 NOTE — PROGRESS NOTES
SPIRITUAL HEALTH SERVICES Progress Note  [facility]    Spiritual Health Services visit per routine admission hospital  request.  Pt receiving x-ray and discharge anticipated thereafter.    Plan: Spiritual Health Services remains available for additional emotional/spiritual support.    Grant Mauricio MA  Staff   Pager: [phone]  Phone: [phone]

## 2020-10-23 NOTE — PLAN OF CARE
Patient more alert currently.  Up with assist of 2 and a gait belt to the bathroom.  More steady on his feet as the night has progressed.  CIWA scores of 1,3 and 0.  Denies having any pain. PSC at the bedside.  Tele is SB/SR with peaked T's.  Possible discharge home later today.  No seizure activity noted, seizure precautions in place. Continue with POC.

## 2020-10-23 NOTE — PLAN OF CARE
Pt is a/o, up independent on room. VSS. Sitter discontinued. Pt is cooperative. CIWA were 0 and 0. Tylenol given for shoulder pain. Xray of shoulder is negative. Plan to discharge home.   Patient's After Visit Summary was reviewed with patient.  Patient verbalized understanding of After Visit Summary, recommended follow up and was given an opportunity to ask questions.   Discharge medications sent home with patient/family: Not applicable - no med changes  Discharged with other:girlfriend.     All belongings sent home with pt including cell phone and clothes.

## 2020-11-10 NOTE — DISCHARGE SUMMARY
[facility]  Hospitalist Discharge Summary      Date of Admission:  10/20/2020  Date of Discharge:  10/23/2020  1:00 PM  Discharging Provider: David Guzman MD      Discharge Diagnoses        Alcohol withdrawal seizures    Alcohol withdrawal syndrome    Hypokalemia    Thrombocytopenia    Follow-ups Needed After Discharge   Follow-up Appointments     Follow-up and recommended labs and tests       Follow up with primary care provider, [facility], within 7   days for hospital follow- up.  No follow up labs or test are needed.  Follow with orthopedic doctor for your shoulder weakness in few days                 Discharge Disposition   Discharged to home  Condition at discharge: Stable    Hospital Course      Nacho Downey is a 35 year old male admitted on 10/20/2020. He has reported past medical history significant for alcohol dependence with ongoing abuse and tobacco use disorder.  He had presented to an outside emergency room due to reported seizure activity.  Patient was found to have acute alcohol withdrawals.  Suspect that seizure activity would have been related to alcohol withdrawals.     Patient was admitted and was closely monitored.  Electrolytes replaced.  He was treated with as needed Ativan as well as clonidine and gabapentin for alcohol withdrawal treatment protocol.  No recurrence of seizure with patient remained encephalopathic and required close monitoring.        Consultations This Hospital Stay   PHYSICAL THERAPY ADULT IP CONSULT    Code Status   Full Code    Time Spent on this Encounter   I, David Guzman MD, personally saw the patient today and spent less than or equal to 30 minutes discharging this patient.       David Guzman MD  [facility] MEDICAL SURGICAL  [address] [place] [address]  Phone: [phone]  Fax: [phone]  ______________________________________________________________________    Physical Exam    Vital Signs:                   Weight: 204 lbs 0 oz       Primary Care Physician   [facility]    Discharge Orders      Reason for your hospital stay    alcohol withdrawal     Follow-up and recommended labs and tests     Follow up with primary care provider, [facility], within 7 days for hospital follow- up.  No follow up labs or test are needed.  Follow with orthopedic doctor for your shoulder weakness in few days     Activity    Your activity upon discharge: activity as tolerated     Full Code     Diet    Follow this diet upon discharge: Orders Placed This Encounter      Combination Diet Regular Diet Adult       Significant Results and Procedures   Most Recent 3 CBC's:  Recent Labs   Lab Test 10/22/20  0624 10/21/20  0702   WBC 13.0* 9.6   HGB 11.8* 11.9*   * 98   PLT 75* 92*     Most Recent 3 BMP's:  Recent Labs   Lab Test 10/22/20  1734 10/22/20  0624 10/21/20  2020 10/21/20  0702 10/20/20  2340   NA  --  137  --  137 134   POTASSIUM 4.1 3.3* 3.5 2.7* 2.6*   CHLORIDE  --  103  --  99 93*   CO2  --  28  --  32 35*   BUN  --  6*  --  8 8   CR  --  0.75  --  0.90 1.03   ANIONGAP  --  6  --  6 6   CECE  --  8.1*  --  7.9* 8.1*   GLC  --  94  --  87 70     Most Recent 2 LFT's:  Recent Labs   Lab Test 10/22/20  0624 10/21/20  0702   AST 30 43   ALT 31 32   ALKPHOS 92 96   BILITOTAL 1.2 1.0   ,   Results for orders placed or performed during the hospital encounter of 10/20/20   XR Chest Port 1 View    Narrative    EXAM: XR CHEST PORT 1 VW  LOCATION: [facility]  DATE/TIME: 10/20/2020 10:56 PM    INDICATION: Cough  COMPARISON: 10/20/2020      Impression    IMPRESSION: Negative chest.   XR Shoulder Right 2 Views    Narrative    XR SHOULDER 2 VIEW RIGHT 10/23/2020 11:57 AM     HISTORY: right shoulder pain and weakness      Impression    IMPRESSION: Right clavicular deformity from healed fracture. Otherwise  unremarkable shoulder radiographs.    BRAYAN MINOR MD       Discharge  Medications   Discharge Medication List as of 10/23/2020 12:11 PM      CONTINUE these medications which have NOT CHANGED    Details   Carboxymethylcellulose Sodium (ARTIFICIAL TEARS OP) Apply to eye as needed (dry eyes) OTC, Historical      IBUPROFEN PO Take by mouth as needed for moderate pain, Historical           Allergies   No Known Allergies

## 2021-01-01 ENCOUNTER — TELEPHONE (OUTPATIENT)
Dept: GERIATRICS | Facility: CLINIC | Age: 36
End: 2021-01-01

## 2021-01-01 ENCOUNTER — RECORDS - HEALTHEAST (OUTPATIENT)
Dept: LAB | Facility: CLINIC | Age: 36
End: 2021-01-01

## 2021-01-01 ENCOUNTER — NURSING HOME VISIT (OUTPATIENT)
Dept: GERIATRICS | Facility: CLINIC | Age: 36
End: 2021-01-01
Payer: COMMERCIAL

## 2021-01-01 ENCOUNTER — HEALTH MAINTENANCE LETTER (OUTPATIENT)
Age: 36
End: 2021-01-01

## 2021-01-01 ENCOUNTER — COMMUNICATION - HEALTHEAST (OUTPATIENT)
Dept: SCHEDULING | Facility: CLINIC | Age: 36
End: 2021-01-01

## 2021-01-01 ENCOUNTER — HOSPITAL ENCOUNTER (EMERGENCY)
Dept: EMERGENCY MEDICINE | Facility: CLINIC | Age: 36
Discharge: ANOTHER HOSPITAL | End: 2021-06-15
Attending: EMERGENCY MEDICINE
Payer: COMMERCIAL

## 2021-01-01 ENCOUNTER — DISCHARGE SUMMARY NURSING HOME (OUTPATIENT)
Dept: GERIATRICS | Facility: CLINIC | Age: 36
End: 2021-01-01
Payer: COMMERCIAL

## 2021-01-01 VITALS
WEIGHT: 184 LBS | OXYGEN SATURATION: 98 % | SYSTOLIC BLOOD PRESSURE: 115 MMHG | HEART RATE: 96 BPM | TEMPERATURE: 97.5 F | HEIGHT: 72 IN | RESPIRATION RATE: 18 BRPM | BODY MASS INDEX: 24.92 KG/M2 | DIASTOLIC BLOOD PRESSURE: 80 MMHG

## 2021-01-01 VITALS
TEMPERATURE: 97.4 F | RESPIRATION RATE: 16 BRPM | SYSTOLIC BLOOD PRESSURE: 97 MMHG | HEART RATE: 73 BPM | HEIGHT: 76 IN | BODY MASS INDEX: 24.83 KG/M2 | DIASTOLIC BLOOD PRESSURE: 57 MMHG | OXYGEN SATURATION: 97 %

## 2021-01-01 VITALS
HEART RATE: 92 BPM | OXYGEN SATURATION: 97 % | TEMPERATURE: 99.5 F | SYSTOLIC BLOOD PRESSURE: 114 MMHG | RESPIRATION RATE: 18 BRPM | BODY MASS INDEX: 23.89 KG/M2 | DIASTOLIC BLOOD PRESSURE: 69 MMHG | HEIGHT: 76 IN | WEIGHT: 196.2 LBS

## 2021-01-01 VITALS
HEIGHT: 72 IN | DIASTOLIC BLOOD PRESSURE: 93 MMHG | TEMPERATURE: 97.6 F | RESPIRATION RATE: 18 BRPM | SYSTOLIC BLOOD PRESSURE: 147 MMHG | BODY MASS INDEX: 25.72 KG/M2 | HEART RATE: 90 BPM | OXYGEN SATURATION: 98 % | WEIGHT: 189.9 LBS

## 2021-01-01 VITALS
SYSTOLIC BLOOD PRESSURE: 112 MMHG | TEMPERATURE: 98.7 F | RESPIRATION RATE: 16 BRPM | DIASTOLIC BLOOD PRESSURE: 68 MMHG | HEART RATE: 80 BPM | WEIGHT: 196.2 LBS | HEIGHT: 76 IN | OXYGEN SATURATION: 95 % | BODY MASS INDEX: 23.89 KG/M2

## 2021-01-01 VITALS
HEIGHT: 72 IN | BODY MASS INDEX: 25.07 KG/M2 | RESPIRATION RATE: 18 BRPM | OXYGEN SATURATION: 97 % | SYSTOLIC BLOOD PRESSURE: 120 MMHG | DIASTOLIC BLOOD PRESSURE: 75 MMHG | WEIGHT: 185.1 LBS | HEART RATE: 90 BPM | TEMPERATURE: 96.9 F

## 2021-01-01 DIAGNOSIS — M19.072 PRIMARY OSTEOARTHRITIS OF LEFT FOOT: ICD-10-CM

## 2021-01-01 DIAGNOSIS — R53.81 PHYSICAL DECONDITIONING: ICD-10-CM

## 2021-01-01 DIAGNOSIS — E55.9 VITAMIN D DEFICIENCY: ICD-10-CM

## 2021-01-01 DIAGNOSIS — G47.00 INSOMNIA, UNSPECIFIED TYPE: ICD-10-CM

## 2021-01-01 DIAGNOSIS — F17.200 TOBACCO DEPENDENCE SYNDROME: ICD-10-CM

## 2021-01-01 DIAGNOSIS — G62.9 POLYNEUROPATHY: ICD-10-CM

## 2021-01-01 DIAGNOSIS — G47.01 INSOMNIA DUE TO MEDICAL CONDITION: ICD-10-CM

## 2021-01-01 DIAGNOSIS — R29.6 MULTIPLE FALLS: ICD-10-CM

## 2021-01-01 DIAGNOSIS — R29.6 RECURRENT FALLS: ICD-10-CM

## 2021-01-01 DIAGNOSIS — E87.6 HYPOKALEMIA: ICD-10-CM

## 2021-01-01 DIAGNOSIS — F10.10 ALCOHOL ABUSE: ICD-10-CM
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
## 2021-01-01 DIAGNOSIS — E83.42 HYPOMAGNESEMIA: ICD-10-CM

## 2021-01-01 DIAGNOSIS — M62.81 GENERALIZED MUSCLE WEAKNESS: ICD-10-CM

## 2021-01-01 DIAGNOSIS — F10.10 ETOH ABUSE: ICD-10-CM

## 2021-01-01 DIAGNOSIS — K59.01 SLOW TRANSIT CONSTIPATION: ICD-10-CM

## 2021-01-01 DIAGNOSIS — Z89.439 HISTORY OF TRANSMETATARSAL AMPUTATION OF FOOT (H): ICD-10-CM

## 2021-01-01 DIAGNOSIS — S32.010D COMPRESSION FRACTURE OF L1 VERTEBRA WITH ROUTINE HEALING, SUBSEQUENT ENCOUNTER: ICD-10-CM

## 2021-01-01 DIAGNOSIS — S32.010A COMPRESSION FRACTURE OF L1 VERTEBRA, INITIAL ENCOUNTER (H): ICD-10-CM

## 2021-01-01 DIAGNOSIS — S32.010D COMPRESSION FRACTURE OF L1 VERTEBRA WITH ROUTINE HEALING, SUBSEQUENT ENCOUNTER: Primary | ICD-10-CM

## 2021-01-01 DIAGNOSIS — F10.930 ALCOHOL WITHDRAWAL SYNDROME WITHOUT COMPLICATION (H): ICD-10-CM

## 2021-01-01 DIAGNOSIS — S32.018D OTHER CLOSED FRACTURE OF FIRST LUMBAR VERTEBRA WITH ROUTINE HEALING, SUBSEQUENT ENCOUNTER: Primary | ICD-10-CM

## 2021-01-01 LAB
ALBUMIN SERPL-MCNC: 3.6 G/DL (ref 3.5–5)
ALP SERPL-CCNC: 120 U/L (ref 45–120)
ALT SERPL W P-5'-P-CCNC: 31 U/L (ref 0–45)
ANION GAP SERPL CALCULATED.3IONS-SCNC: 10 MMOL/L (ref 5–18)
ANION GAP SERPL CALCULATED.3IONS-SCNC: 17 MMOL/L (ref 5–18)
AST SERPL W P-5'-P-CCNC: 49 U/L (ref 0–40)
ATRIAL RATE - MUSE: 126 BPM
BASOPHILS # BLD AUTO: 0.1 THOU/UL (ref 0–0.2)
BASOPHILS NFR BLD AUTO: 1 % (ref 0–2)
BILIRUB SERPL-MCNC: 0.8 MG/DL (ref 0–1)
BUN SERPL-MCNC: 7 MG/DL (ref 8–22)
BUN SERPL-MCNC: 9 MG/DL (ref 8–22)
CALCIUM SERPL-MCNC: 8.7 MG/DL (ref 8.5–10.5)
CALCIUM SERPL-MCNC: 9.4 MG/DL (ref 8.5–10.5)
CHLORIDE BLD-SCNC: 105 MMOL/L (ref 98–107)
CHLORIDE BLD-SCNC: 106 MMOL/L (ref 98–107)
CO2 SERPL-SCNC: 20 MMOL/L (ref 22–31)
CO2 SERPL-SCNC: 26 MMOL/L (ref 22–31)
CREAT SERPL-MCNC: 0.64 MG/DL (ref 0.7–1.3)
CREAT SERPL-MCNC: 0.84 MG/DL (ref 0.7–1.3)
DIASTOLIC BLOOD PRESSURE - MUSE: 99 MMHG
EOSINOPHIL # BLD AUTO: 0 THOU/UL (ref 0–0.4)
EOSINOPHIL NFR BLD AUTO: 0 % (ref 0–6)
ERYTHROCYTE [DISTWIDTH] IN BLOOD BY AUTOMATED COUNT: 15.9 % (ref 11–14.5)
ETHANOL SERPL-MCNC: 34 MG/DL
GFR SERPL CREATININE-BSD FRML MDRD: >60 ML/MIN/1.73M2
GFR SERPL CREATININE-BSD FRML MDRD: >60 ML/MIN/1.73M2
GLUCOSE BLD-MCNC: 103 MG/DL (ref 70–125)
GLUCOSE BLD-MCNC: 92 MG/DL (ref 70–125)
HCT VFR BLD AUTO: 41 % (ref 40–54)
HGB BLD-MCNC: 13.8 G/DL (ref 14–18)
IMM GRANULOCYTES # BLD: 0.1 THOU/UL
IMM GRANULOCYTES NFR BLD: 1 %
INR PPP: 1.02 (ref 0.9–1.1)
INTERPRETATION ECG - MUSE: NORMAL
LYMPHOCYTES # BLD AUTO: 2.4 THOU/UL (ref 0.8–4.4)
LYMPHOCYTES NFR BLD AUTO: 21 % (ref 20–40)
MAGNESIUM SERPL-MCNC: 1.6 MG/DL (ref 1.8–2.6)
MAGNESIUM SERPL-MCNC: 1.9 MG/DL (ref 1.8–2.6)
MCH RBC QN AUTO: 32.2 PG (ref 27–34)
MCHC RBC AUTO-ENTMCNC: 33.7 G/DL (ref 32–36)
MCV RBC AUTO: 96 FL (ref 80–100)
MONOCYTES # BLD AUTO: 0.7 THOU/UL (ref 0–0.9)
MONOCYTES NFR BLD AUTO: 6 % (ref 2–10)
NEUTROPHILS # BLD AUTO: 8.3 THOU/UL (ref 2–7.7)
NEUTROPHILS NFR BLD AUTO: 72 % (ref 50–70)
P AXIS - MUSE: 61 DEGREES
PLATELET # BLD AUTO: 260 THOU/UL (ref 140–440)
PMV BLD AUTO: 9.7 FL (ref 8.5–12.5)
POTASSIUM BLD-SCNC: 3.3 MMOL/L (ref 3.5–5)
POTASSIUM BLD-SCNC: 4.5 MMOL/L (ref 3.5–5)
PR INTERVAL - MUSE: 142 MS
PROT SERPL-MCNC: 6.5 G/DL (ref 6–8)
QRS DURATION - MUSE: 84 MS
QT - MUSE: 322 MS
QTC - MUSE: 466 MS
R AXIS - MUSE: 36 DEGREES
RBC # BLD AUTO: 4.29 MILL/UL (ref 4.4–6.2)
SARS-COV-2 PCR RESULT-HE - HISTORICAL: NEGATIVE
SODIUM SERPL-SCNC: 142 MMOL/L (ref 136–145)
SODIUM SERPL-SCNC: 142 MMOL/L (ref 136–145)
SYSTOLIC BLOOD PRESSURE - MUSE: 152 MMHG
T AXIS - MUSE: 56 DEGREES
VENTRICULAR RATE- MUSE: 126 BPM
WBC: 11.6 THOU/UL (ref 4–11)

## 2021-01-01 PROCEDURE — 99316 NF DSCHRG MGMT 30 MIN+: CPT | Performed by: NURSE PRACTITIONER

## 2021-01-01 PROCEDURE — 99305 1ST NF CARE MODERATE MDM 35: CPT | Performed by: INTERNAL MEDICINE

## 2021-01-01 PROCEDURE — 99310 SBSQ NF CARE HIGH MDM 45: CPT | Performed by: NURSE PRACTITIONER

## 2021-01-01 RX ORDER — METHOCARBAMOL 500 MG/1
500 TABLET, FILM COATED ORAL EVERY 6 HOURS
COMMUNITY
Start: 2021-01-01

## 2021-01-01 RX ORDER — LIDOCAINE 4 G/G
1 PATCH TOPICAL EVERY 24 HOURS
COMMUNITY
Start: 2021-01-01

## 2021-01-01 RX ORDER — OXYCODONE HYDROCHLORIDE 5 MG/1
5 TABLET ORAL EVERY 4 HOURS PRN
Qty: 30 TABLET | Refills: 0 | Status: SHIPPED | OUTPATIENT
Start: 2021-01-01

## 2021-01-01 RX ORDER — GABAPENTIN 300 MG/1
600 CAPSULE ORAL 3 TIMES DAILY
COMMUNITY
Start: 2021-01-01

## 2021-01-01 RX ORDER — ACETAMINOPHEN 325 MG/1
650 TABLET ORAL EVERY 4 HOURS PRN
COMMUNITY
Start: 2021-01-01

## 2021-01-01 RX ORDER — OXYCODONE HYDROCHLORIDE 5 MG/1
5 TABLET ORAL EVERY 4 HOURS PRN
COMMUNITY
Start: 2021-01-01 | End: 2021-01-01

## 2021-01-01 RX ORDER — OXYCODONE HYDROCHLORIDE 5 MG/1
5 TABLET ORAL EVERY 4 HOURS PRN
Qty: 30 TABLET | Refills: 0 | Status: SHIPPED | OUTPATIENT
Start: 2021-01-01 | End: 2021-01-01

## 2021-01-01 RX ORDER — OXYCODONE HYDROCHLORIDE 5 MG/1
5 TABLET ORAL EVERY 4 HOURS PRN
Qty: 60 TABLET | Refills: 0 | Status: SHIPPED | OUTPATIENT
Start: 2021-01-01 | End: 2021-01-01

## 2021-01-01 RX ORDER — LANOLIN ALCOHOL/MO/W.PET/CERES
6 CREAM (GRAM) TOPICAL AT BEDTIME
COMMUNITY
Start: 2021-01-01

## 2021-01-01 RX ORDER — ERGOCALCIFEROL 1.25 MG/1
50000 CAPSULE, LIQUID FILLED ORAL WEEKLY
COMMUNITY
Start: 2021-01-01

## 2021-01-01 RX ORDER — FOLIC ACID 1 MG/1
1 TABLET ORAL DAILY
COMMUNITY
Start: 2021-01-01

## 2021-01-01 SDOH — HEALTH STABILITY: MENTAL HEALTH: HOW OFTEN DO YOU HAVE 6 OR MORE DRINKS ON ONE OCCASION?: NOT ASKED

## 2021-01-01 SDOH — HEALTH STABILITY: MENTAL HEALTH: HOW OFTEN DO YOU HAVE A DRINK CONTAINING ALCOHOL?: NOT ASKED

## 2021-01-01 SDOH — HEALTH STABILITY: MENTAL HEALTH: HOW MANY STANDARD DRINKS CONTAINING ALCOHOL DO YOU HAVE ON A TYPICAL DAY?: NOT ASKED

## 2021-01-01 ASSESSMENT — MIFFLIN-ST. JEOR
SCORE: 1797.62
SCORE: 1802.61
SCORE: 1824.38

## 2021-02-10 ENCOUNTER — AMBULATORY - HEALTHEAST (OUTPATIENT)
Dept: ADMINISTRATIVE | Facility: CLINIC | Age: 36
End: 2021-02-10

## 2021-02-10 ENCOUNTER — OFFICE VISIT - HEALTHEAST (OUTPATIENT)
Dept: GERIATRICS | Facility: CLINIC | Age: 36
End: 2021-02-10

## 2021-02-10 DIAGNOSIS — Z89.439 HISTORY OF TRANSMETATARSAL AMPUTATION OF FOOT (H): ICD-10-CM

## 2021-02-10 DIAGNOSIS — F51.01 PRIMARY INSOMNIA: ICD-10-CM

## 2021-02-10 DIAGNOSIS — G62.9 POLYNEUROPATHY: ICD-10-CM

## 2021-02-10 DIAGNOSIS — Z72.0 TOBACCO ABUSE: ICD-10-CM

## 2021-02-10 DIAGNOSIS — F10.10 ALCOHOL ABUSE: ICD-10-CM

## 2021-02-10 DIAGNOSIS — S62.617D CLOSED DISPLACED FRACTURE OF PROXIMAL PHALANX OF LEFT LITTLE FINGER WITH ROUTINE HEALING, SUBSEQUENT ENCOUNTER: ICD-10-CM

## 2021-02-10 DIAGNOSIS — F41.9 ANXIETY: ICD-10-CM

## 2021-02-11 ENCOUNTER — RECORDS - HEALTHEAST (OUTPATIENT)
Dept: LAB | Facility: CLINIC | Age: 36
End: 2021-02-11

## 2021-02-12 ENCOUNTER — COMMUNICATION - HEALTHEAST (OUTPATIENT)
Dept: GERIATRICS | Facility: CLINIC | Age: 36
End: 2021-02-12

## 2021-02-12 ENCOUNTER — OFFICE VISIT - HEALTHEAST (OUTPATIENT)
Dept: GERIATRICS | Facility: CLINIC | Age: 36
End: 2021-02-12

## 2021-02-12 DIAGNOSIS — T33.821D FROSTBITE OF BOTH FEET, SUBSEQUENT ENCOUNTER: ICD-10-CM

## 2021-02-12 DIAGNOSIS — G62.9 POLYNEUROPATHY: ICD-10-CM

## 2021-02-12 DIAGNOSIS — S62.617D CLOSED DISPLACED FRACTURE OF PROXIMAL PHALANX OF LEFT LITTLE FINGER WITH ROUTINE HEALING, SUBSEQUENT ENCOUNTER: ICD-10-CM

## 2021-02-12 DIAGNOSIS — Z72.0 TOBACCO ABUSE: ICD-10-CM

## 2021-02-12 DIAGNOSIS — Z89.439 HISTORY OF TRANSMETATARSAL AMPUTATION OF FOOT (H): ICD-10-CM

## 2021-02-12 DIAGNOSIS — F41.9 ANXIETY: ICD-10-CM

## 2021-02-12 DIAGNOSIS — T33.822D FROSTBITE OF BOTH FEET, SUBSEQUENT ENCOUNTER: ICD-10-CM

## 2021-02-12 DIAGNOSIS — F10.10 ALCOHOL ABUSE: ICD-10-CM

## 2021-02-12 LAB
ANION GAP SERPL CALCULATED.3IONS-SCNC: 6 MMOL/L (ref 5–18)
BASOPHILS # BLD AUTO: 0.2 THOU/UL (ref 0–0.2)
BASOPHILS NFR BLD AUTO: 1 % (ref 0–2)
BUN SERPL-MCNC: 9 MG/DL (ref 8–22)
CALCIUM SERPL-MCNC: 8.9 MG/DL (ref 8.5–10.5)
CHLORIDE BLD-SCNC: 105 MMOL/L (ref 98–107)
CO2 SERPL-SCNC: 25 MMOL/L (ref 22–31)
CREAT SERPL-MCNC: 0.66 MG/DL (ref 0.7–1.3)
EOSINOPHIL # BLD AUTO: 0.2 THOU/UL (ref 0–0.4)
EOSINOPHIL NFR BLD AUTO: 2 % (ref 0–6)
ERYTHROCYTE [DISTWIDTH] IN BLOOD BY AUTOMATED COUNT: 14.3 % (ref 11–14.5)
GFR SERPL CREATININE-BSD FRML MDRD: >60 ML/MIN/1.73M2
GLUCOSE BLD-MCNC: 102 MG/DL (ref 70–125)
HCT VFR BLD AUTO: 32.1 % (ref 40–54)
HGB BLD-MCNC: 10 G/DL (ref 14–18)
IMM GRANULOCYTES # BLD: 0.6 THOU/UL
IMM GRANULOCYTES NFR BLD: 4 %
LYMPHOCYTES # BLD AUTO: 3.4 THOU/UL (ref 0.8–4.4)
LYMPHOCYTES NFR BLD AUTO: 22 % (ref 20–40)
MCH RBC QN AUTO: 30.4 PG (ref 27–34)
MCHC RBC AUTO-ENTMCNC: 31.2 G/DL (ref 32–36)
MCV RBC AUTO: 98 FL (ref 80–100)
MONOCYTES # BLD AUTO: 1.9 THOU/UL (ref 0–0.9)
MONOCYTES NFR BLD AUTO: 12 % (ref 2–10)
NEUTROPHILS # BLD AUTO: 8.8 THOU/UL (ref 2–7.7)
NEUTROPHILS NFR BLD AUTO: 59 % (ref 50–70)
PLATELET # BLD AUTO: 546 THOU/UL (ref 140–440)
PMV BLD AUTO: 9.6 FL (ref 8.5–12.5)
POTASSIUM BLD-SCNC: 4.6 MMOL/L (ref 3.5–5)
RBC # BLD AUTO: 3.29 MILL/UL (ref 4.4–6.2)
SODIUM SERPL-SCNC: 136 MMOL/L (ref 136–145)
WBC: 15.1 THOU/UL (ref 4–11)

## 2021-02-12 ASSESSMENT — MIFFLIN-ST. JEOR: SCORE: 1916.46

## 2021-02-15 ENCOUNTER — COMMUNICATION - HEALTHEAST (OUTPATIENT)
Dept: GERIATRICS | Facility: CLINIC | Age: 36
End: 2021-02-15

## 2021-02-15 DIAGNOSIS — Z89.439 HISTORY OF TRANSMETATARSAL AMPUTATION OF FOOT (H): ICD-10-CM

## 2021-02-15 ASSESSMENT — MIFFLIN-ST. JEOR: SCORE: 1916.46

## 2021-02-16 ENCOUNTER — OFFICE VISIT - HEALTHEAST (OUTPATIENT)
Dept: GERIATRICS | Facility: CLINIC | Age: 36
End: 2021-02-16

## 2021-02-16 DIAGNOSIS — Z89.439 HISTORY OF TRANSMETATARSAL AMPUTATION OF FOOT (H): ICD-10-CM

## 2021-02-18 ENCOUNTER — AMBULATORY - HEALTHEAST (OUTPATIENT)
Dept: GERIATRICS | Facility: CLINIC | Age: 36
End: 2021-02-18
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
## 2021-06-15 NOTE — TELEPHONE ENCOUNTER
[facility] Nurse Triage Telephone Note      Provider: STACY Irvin  Facility: [facility]    Facility Type: TCU    Caller: Megan  Call Back Number:  [phone]    Allergies: Other drug allergy (see comments)    Reason for call: WBC 15.1 (15.5), Hgb 10.0, Plt 546, K 4.6, Cr 0.66, GFR > 60.     Verbal Order/Direction given by Provider: NNO    Provider giving order: STACY Irvin    Verbal order given to: Megan Guillermo RN

## 2021-06-15 NOTE — PROGRESS NOTES
[facility]    DATE: 2/10/2021    NAME:  Nacho Downey             :  1985  MRN: [id number]  CODE STATUS:  FULL CODE    VISIT TYPE: Problem Visit (admit tcu, foot surgery, pain check )     FACILITY:  [facility] [[id number]]       CHIEF COMPLAIN/REASON FOR VISIT:    Chief Complaint   Patient presents with     Problem Visit     admit tcu, foot surgery, pain check                HISTORY OF PRESENT ILLNESS: Nacho Downey is a 35 y.o. male who was admitted to [facility] - for bilateral transmetatarsal amputation, gastroc tendon lengthening bone biopsy, and flap closure for frostbite, gangrene, and osteomyelitis. Hospital course was unremarkable. He was also noted to have left proximal phalanx base fracture and plastic surgery was consulted. They recommended outpatient follow up and surgery. This was suspected to be an injury related to a fall on New years. He had worsening anxiety during hospital course. He was recommended trazodone and hydroxyzine and to follow up in few weeks for further med management. He did not have any signs of alcohol withdrawal during hospital course. He was started on habitrol patches for nicotine replacement. He was recommended TCU transfer for further rehab. He has PMH of anxiety, polyneuropathy, ETOH abuse and withdrawal with seizures, tobacco abuse. Prior to this he lived at home.     Today Mr. Downey is seen for admit to tcu, foot surgery, and pain check. He is seen in bed today. He says he kicked off his leg elevating devices at some point during the night. He is supposed to sleep with his legs elevated but it isn't the most comfortable. He says he had some cramps in his calves during the night and has had these intermittently. He is having some pain in his feet and few phantom pains. He denies any tremors or shakiness. He is not having fever or chills. He denies any breathing concerns or cough. His bowels are moving fine  and no urinary issues. He feels his anxiety is controlled. He is not having any dizziness or lightheadedness. He denies any other concerns today. He is eating well and having no nausea. He says he has not had a cigarette since the day before his surgery. He denies any cravings and is interested in continuing nicotine patches. We did discuss smoking cessation options today. He denies any alcohol withdrawal symptoms. Per staff he has been having some muscle cramping but otherwise vitals are stable. He is using tylenol and oxycodone for pain.     REVIEW OF SYSTEMS:  PROBLEMS AND REVIEW OF SYSTEMS:   Today on ROS:   Currently, no fever, chills, or rigors. Does not have any visual or hearing problems. Denies any chest pain, headaches, palpitations, lightheadedness, dizziness, shortness of breath, or cough. Appetite is good. Denies any GERD symptoms. Denies any difficulty with swallowing, nausea, or vomiting.  Denies any abdominal pain, diarrhea or constipation. Denies any urinary symptoms. No insomnia. No active bleeding. No rash. Positive for weakness, bilateral foot wounds covered, minimal leg swelling, pain controlled, muscle cramping, numb/tingling ble      Allergies   Allergen Reactions     Other Drug Allergy (See Comments)      Hx of prolong QT on EKG. Avoid QT prolonging meds or get EKG prior     Current Outpatient Medications   Medication Sig     cyclobenzaprine (FLEXERIL) 5 MG tablet Take 5 mg by mouth every 6 (six) hours as needed for muscle spasms.     acetaminophen (TYLENOL) 500 MG tablet Take 1,000 mg by mouth every 6 (six) hours as needed for pain.      gabapentin (NEURONTIN) 300 MG capsule Take 600 mg by mouth 3 (three) times a day.     hydrOXYzine pamoate (VISTARIL) 25 MG capsule Take 25-50 mg by mouth every 4 (four) hours as needed for anxiety.     melatonin 3 mg Tab tablet Take 6 mg by mouth at bedtime.     multivitamin with minerals tablet Take 1 tablet by mouth daily.     nicotine (NICODERM CQ) 14  mg/24 hr Place 1 patch on the skin daily.     oxyCODONE (ROXICODONE) 5 MG immediate release tablet Take 5 mg by mouth every 4 (four) hours as needed for pain.     rivaroxaban ANTICOAGULANT (XARELTO) 10 mg tablet Take 10 mg by mouth daily.     traZODone (DESYREL) 50 MG tablet Take 25 mg by mouth at bedtime.     Past Medical History:    Past Medical History:   Diagnosis Date     Acute osteomyelitis of right foot (H)      Alcohol abuse      Anxiety      Broken collarbone      Corneal abrasion      Gangrene (H)      Mandible fracture (H)      Open wound of left foot      Open wound of right foot      Polyneuropathy      Wound infection after surgery      Social History     Socioeconomic History     Marital status: Single     Spouse name: None     Number of children: None     Years of education: None     Highest education level: None   Occupational History     None   Social Needs     Financial resource strain: None     Food insecurity     Worry: None     Inability: None     Transportation needs     Medical: None     Non-medical: None   Tobacco Use     Smoking status: Current Some Day Smoker     Packs/day: 0.50     Types: Cigarettes     Smokeless tobacco: Never Used   Substance and Sexual Activity     Alcohol use: Yes     Alcohol/week: 12.0 standard drinks     Types: 12 Standard drinks or equivalent per week     Drug use: None     Sexual activity: None   Lifestyle     Physical activity     Days per week: None     Minutes per session: None     Stress: None   Relationships     Social connections     Talks on phone: None     Gets together: None     Attends [employer] service: None     Active member of club or organization: None     Attends meetings of clubs or organizations: None     Relationship status: None     Intimate partner violence     Fear of current or ex partner: None     Emotionally abused: None     Physically abused: None     Forced sexual activity: None   Other Topics Concern     Incontinent No     Toileting  "independently No     Cognitive impairment No     Vision impairment No     Hearing impairment No     Dentures No   Social History Narrative     None     Family history: see chart      PHYSICAL EXAMINATION  Vitals:    02/10/21 0407   BP: 97/57   Pulse: 73   Resp: 16   Temp: 97.4  F (36.3  C)   SpO2: 97%   Height: 6' 4\" (1.93 m)       Today on physical exam:     GENERAL: Awake, Alert, oriented x3, not in any form of acute distress, answers questions appropriately, follows simple commands, conversant, weakness  HEENT: Head is normocephalic with normal hair distribution. No evidence of trauma. Ears: No acute purulent discharge. Eyes: Conjunctivae pink with no scleral jaundice. Nose: Normal mucosa and septum. NECK: Supple with no cervical or supraclavicular lymphadenopathy. Trachea is midline.   CHEST: No tenderness or deformity, no crepitus  LUNG: dim to auscultation with good chest expansion. There are no crackles or wheezes, normal AP diameter. No shortness of breath or cough  BACK: No kyphosis of the thoracic spine. Symmetric, no curvature, ROM normal, no CVA tenderness, no spinal tenderness   CVS: There is good S1  S2, regular rhythm, there are no murmurs, rubs, gallops, or heaves,  2+ pulses symmetric in all extremities.  ABDOMEN: Rounded and soft, nontender to palpation, non distended, no masses, no organomegaly, good bowel sounds, no rebound or guarding, no peritoneal signs.   EXTREMITIES: trace ble pedal edema, numb/tingling ble. Bilateral foot wounds covered  SKIN: Warm and dry, no erythema noted.  Skin color, texture, no rashes or lesions.  NEUROLOGICAL: The patient is oriented to person, place and time.             LABS:   Recent Results (from the past 168 hour(s))   Basic Metabolic Panel   Result Value Ref Range    Sodium 136 136 - 145 mmol/L    Potassium 4.6 3.5 - 5.0 mmol/L    Chloride 105 98 - 107 mmol/L    CO2 25 22 - 31 mmol/L    Anion Gap, Calculation 6 5 - 18 mmol/L    Glucose 102 70 - 125 mg/dL    " Calcium 8.9 8.5 - 10.5 mg/dL    BUN 9 8 - 22 mg/dL    Creatinine 0.66 (L) 0.70 - 1.30 mg/dL    GFR MDRD Af Amer >60 >60 mL/min/1.73m2    GFR MDRD Non Af Amer >60 >60 mL/min/1.73m2   HM1 (CBC with Diff)   Result Value Ref Range    WBC 15.1 (H) 4.0 - 11.0 thou/uL    RBC 3.29 (L) 4.40 - 6.20 mill/uL    Hemoglobin 10.0 (L) 14.0 - 18.0 g/dL    Hematocrit 32.1 (L) 40.0 - 54.0 %    MCV 98 80 - 100 fL    MCH 30.4 27.0 - 34.0 pg    MCHC 31.2 (L) 32.0 - 36.0 g/dL    RDW 14.3 11.0 - 14.5 %    Platelets 546 (H) 140 - 440 thou/uL    MPV 9.6 8.5 - 12.5 fL    Neutrophils % 59 50 - 70 %    Lymphocytes % 22 20 - 40 %    Monocytes % 12 (H) 2 - 10 %    Eosinophils % 2 0 - 6 %    Basophils % 1 0 - 2 %    Immature Granulocyte % 4 (H) <=0 %    Neutrophils Absolute 8.8 (H) 2.0 - 7.7 thou/uL    Lymphocytes Absolute 3.4 0.8 - 4.4 thou/uL    Monocytes Absolute 1.9 (H) 0.0 - 0.9 thou/uL    Eosinophils Absolute 0.2 0.0 - 0.4 thou/uL    Basophils Absolute 0.2 0.0 - 0.2 thou/uL    Immature Granulocyte Absolute 0.6 (H) <=0.0 thou/uL     No results found for this or any previous visit.      Lab Results   Component Value Date    WBC 15.1 (H) 02/12/2021    HGB 10.0 (L) 02/12/2021    HCT 32.1 (L) 02/12/2021    MCV 98 02/12/2021     (H) 02/12/2021       No results found for: [id number]  No results found for: HGBA1C  Lab Results   Component Value Date    INR 0.97 08/20/2012     No results found for: [id number]  No results found for: TSH        ASSESSMENT/PLAN:    S/p Bilateral transmetatarsal amputation, gastroc tendon lengthening bone biopsy, flap closure: wounds covered today. Dressing cares per surgical team. F/u with [facility] team on 2/15. Discussed pain regimen with him and feels pain controlled on tylenol, oxycodone. Some muscle cramping, will add flexeril prn. Bmp, hm 1 ordered for 2/12.   Left proximal phalanx fracture: evaluated by plastic surgeon in hospital and recommended outpatient follow up. F/u in 1-2 weeks in clinic to  evaluate for surgery.   Polyneuropathy: on gabapentin. Reports at baseline.   Tobacco abuse: smoking cessation counseling today. Ordered nicotine patches. Reports no cravings.   Anxiety: on hydroxyzine prn, trazodone. Feels controlled today. Discussed with nursing to notify if not controlled.   H/o ETOH abuse, withdrawal seizures: denies any recent withdrawal symptoms. Discussed with nursing to notify for any signs of tremors, tachycardia, diaphoresis, etc.   Insomnia: on melatonin at bedtime, trazodone at bedtime.       Electronically signed by: Felicitas Zhao NP    Total floor/unit time spent 47 min with >50% time spent on counseling and coordination of care. Counseling was done regarding pain management. Coordination of care with nursing for management of alcohol withdrawal. Additional 8 min spent on tobacco abuse counseling with discussion of med management, behavioral modifications, identifying triggers, nicotine replacement options.

## 2021-06-15 NOTE — PROGRESS NOTES
[facility]    DATE: 2021    NAME:  Nacho Downey             :  1985  MRN: [id number]  CODE STATUS:  FULL CODE    VISIT TYPE: Discharge Summary     FACILITY:  [facility] [[id number]]       CHIEF COMPLAIN/REASON FOR VISIT:    Chief Complaint   Patient presents with     Discharge Summary               HISTORY OF PRESENT ILLNESS: Nacho Downey is a 35 y.o. male who was admitted to [facility] - for bilateral transmetatarsal amputation, gastroc tendon lengthening bone biopsy, and flap closure for frostbite, gangrene, and osteomyelitis. Hospital course was unremarkable. He was also noted to have left proximal phalanx base fracture and plastic surgery was consulted. They recommended outpatient follow up and surgery. This was suspected to be an injury related to a fall on [place] years. He had worsening anxiety during hospital course. He was recommended trazodone and hydroxyzine and to follow up in few weeks for further med management. He did not have any signs of alcohol withdrawal during hospital course. He was started on habitrol patches for nicotine replacement. He was recommended TCU transfer for further rehab. He has PMH of anxiety, polyneuropathy, ETOH abuse and withdrawal with seizures, tobacco abuse. Prior to this he lived at home.     TCU course:   Mr. Downey has made progress with therapy and is felt to be appropriate for discharge home per insurance at wheelchair level. He is independent at wheelchair level. During his short tcu course his pain was well controlled and he was encouraged to wean down on oxycodone. He did have some muscle cramping and was started on flexeril. His vitals and labs were stable. He will be discharging home on  with  PT, OT, RN for wound cares. He will be sent with small amount of remaining oxycodone and will wean off at home. He was instructed to contact ortho if needing more. He does have postop follow up on  2/15 with [facility]. He was encouraged to schedule f/u with plastic surgeon in clinic in 1-2 weeks. He did not have any signs of alcohol withdrawal during tcu course. He continues on nicotine patches for smoking cessation. Wheelchair, walker, and shower bench ordered for him. He was recommended to follow-up with PCP in 5-7 days.     DME Order  Wheelchair    Wheelchair Face to Face documentation  Length of need: 99 (lifetime)  Diagnosis:   1. History of transmetatarsal amputation of foot (H)     2. Polyneuropathy     3. Closed displaced fracture of proximal phalanx of left little finger with routine healing, subsequent encounter     4. Tobacco abuse     5. Anxiety     6. Frostbite of both feet, subsequent encounter     7. Alcohol abuse          Face to face completed with Nacho Downey in regards to using a wheelchair.  Patient requires a wheelchair to use within the home due to the following limitations in mobility: amputation of all toes on Bilateral feet, Heel weight bearing on Bilateral feet,  poor standing balance, weakness, fatigue, and abnormalities of gait.  Patient has impairment in ability to complete mobility related ADLs from standing.  Patient would not be able to safely complete mobility and mobility related ADLs with use of a cane, walker or crutches.  Patient has expressed willingness to propel a wheelchair.  Patient has physical ability to propel wheelchair.  patient s home can accommodate use of the wheelchair for mobility.     Please contact with questions [phone], Fax: [phone].    Signature  Felicitas Cece NP  NPI [id number]        REVIEW OF SYSTEMS:  PROBLEMS AND REVIEW OF SYSTEMS:   Today on ROS:   Currently, no fever, chills, or rigors. Does not have any visual or hearing problems. Denies any chest pain, headaches, palpitations, lightheadedness, dizziness, shortness of breath, or cough. Appetite is good. Denies any GERD symptoms. Denies any difficulty with swallowing, nausea, or vomiting.   Denies any abdominal pain, diarrhea or constipation. Denies any urinary symptoms. No insomnia. No active bleeding. No rash. Positive for weakness, bilateral foot wounds covered, minimal leg swelling, pain controlled, muscle cramping, numb/tingling ble      Allergies   Allergen Reactions     Other Drug Allergy (See Comments)      Hx of prolong QT on EKG. Avoid QT prolonging meds or get EKG prior     Current Outpatient Medications   Medication Sig     acetaminophen (TYLENOL) 500 MG tablet Take 1,000 mg by mouth every 6 (six) hours as needed for pain.      cyclobenzaprine (FLEXERIL) 5 MG tablet Take 5 mg by mouth every 6 (six) hours as needed for muscle spasms. And at bedtime scheduled     gabapentin (NEURONTIN) 300 MG capsule Take 600 mg by mouth 3 (three) times a day.     hydrOXYzine pamoate (VISTARIL) 25 MG capsule Take 25-50 mg by mouth every 4 (four) hours as needed for anxiety.     melatonin 3 mg Tab tablet Take 6 mg by mouth at bedtime.     multivitamin with minerals tablet Take 1 tablet by mouth daily.     nicotine (NICODERM CQ) 14 mg/24 hr Place 1 patch on the skin daily.     oxyCODONE (ROXICODONE) 5 MG immediate release tablet Take 5 mg by mouth every 4 (four) hours as needed for pain.     rivaroxaban ANTICOAGULANT (XARELTO) 10 mg tablet Take 10 mg by mouth daily.     traZODone (DESYREL) 50 MG tablet Take 25 mg by mouth at bedtime.     Past Medical History:    Past Medical History:   Diagnosis Date     Acute osteomyelitis of right foot (H)      Alcohol abuse      Anxiety      Broken collarbone      Corneal abrasion      Gangrene (H)      Mandible fracture (H)      Open wound of left foot      Open wound of right foot      Polyneuropathy      Wound infection after surgery      Social History     Socioeconomic History     Marital status: Single     Spouse name: Not on file     Number of children: Not on file     Years of education: Not on file     Highest education level: Not on file   Occupational History      "Not on file   Social Needs     Financial resource strain: Not on file     Food insecurity     Worry: Not on file     Inability: Not on file     Transportation needs     Medical: Not on file     Non-medical: Not on file   Tobacco Use     Smoking status: Current Some Day Smoker     Packs/day: 0.50     Types: Cigarettes     Smokeless tobacco: Never Used   Substance and Sexual Activity     Alcohol use: Yes     Alcohol/week: 12.0 standard drinks     Types: 12 Standard drinks or equivalent per week     Drug use: Not on file     Sexual activity: Not on file   Lifestyle     Physical activity     Days per week: Not on file     Minutes per session: Not on file     Stress: Not on file   Relationships     Social connections     Talks on phone: Not on file     Gets together: Not on file     Attends Jainism service: Not on file     Active member of club or organization: Not on file     Attends meetings of clubs or organizations: Not on file     Relationship status: Not on file     Intimate partner violence     Fear of current or ex partner: Not on file     Emotionally abused: Not on file     Physically abused: Not on file     Forced sexual activity: Not on file   Other Topics Concern     Incontinent No     Toileting independently No     Cognitive impairment No     Vision impairment No     Hearing impairment No     Dentures No   Social History Narrative     Not on file     Family history: see chart      PHYSICAL EXAMINATION  Vitals:    02/12/21 0920   BP: 112/68   Pulse: 80   Resp: 16   Temp: 98.7  F (37.1  C)   SpO2: 95%   Weight: 196 lb 3.2 oz (89 kg)   Height: 6' 4\" (1.93 m)       Today on physical exam:     GENERAL: Awake, Alert, oriented x3, not in any form of acute distress, answers questions appropriately, follows simple commands, conversant, weakness  HEENT: Head is normocephalic with normal hair distribution. No evidence of trauma. Ears: No acute purulent discharge. Eyes: Conjunctivae pink with no scleral jaundice. " Nose: Normal mucosa and septum. NECK: Supple with no cervical or supraclavicular lymphadenopathy. Trachea is midline.   CHEST: No tenderness or deformity, no crepitus  LUNG: dim to auscultation with good chest expansion. There are no crackles or wheezes, normal AP diameter. No shortness of breath or cough  BACK: No kyphosis of the thoracic spine. Symmetric, no curvature, ROM normal, no CVA tenderness, no spinal tenderness   CVS: There is good S1  S2, regular rhythm, there are no murmurs, rubs, gallops, or heaves,  2+ pulses symmetric in all extremities.  ABDOMEN: Rounded and soft, nontender to palpation, non distended, no masses, no organomegaly, good bowel sounds, no rebound or guarding, no peritoneal signs.   EXTREMITIES: trace ble pedal edema, numb/tingling ble. Bilateral foot wounds covered  SKIN: Warm and dry, no erythema noted.  Skin color, texture, no rashes or lesions.  NEUROLOGICAL: The patient is oriented to person, place and time.             LABS:   Recent Results (from the past 168 hour(s))   Basic Metabolic Panel   Result Value Ref Range    Sodium 136 136 - 145 mmol/L    Potassium 4.6 3.5 - 5.0 mmol/L    Chloride 105 98 - 107 mmol/L    CO2 25 22 - 31 mmol/L    Anion Gap, Calculation 6 5 - 18 mmol/L    Glucose 102 70 - 125 mg/dL    Calcium 8.9 8.5 - 10.5 mg/dL    BUN 9 8 - 22 mg/dL    Creatinine 0.66 (L) 0.70 - 1.30 mg/dL    GFR MDRD Af Amer >60 >60 mL/min/1.73m2    GFR MDRD Non Af Amer >60 >60 mL/min/1.73m2   HM1 (CBC with Diff)   Result Value Ref Range    WBC 15.1 (H) 4.0 - 11.0 thou/uL    RBC 3.29 (L) 4.40 - 6.20 mill/uL    Hemoglobin 10.0 (L) 14.0 - 18.0 g/dL    Hematocrit 32.1 (L) 40.0 - 54.0 %    MCV 98 80 - 100 fL    MCH 30.4 27.0 - 34.0 pg    MCHC 31.2 (L) 32.0 - 36.0 g/dL    RDW 14.3 11.0 - 14.5 %    Platelets 546 (H) 140 - 440 thou/uL    MPV 9.6 8.5 - 12.5 fL    Neutrophils % 59 50 - 70 %    Lymphocytes % 22 20 - 40 %    Monocytes % 12 (H) 2 - 10 %    Eosinophils % 2 0 - 6 %    Basophils % 1 0  - 2 %    Immature Granulocyte % 4 (H) <=0 %    Neutrophils Absolute 8.8 (H) 2.0 - 7.7 thou/uL    Lymphocytes Absolute 3.4 0.8 - 4.4 thou/uL    Monocytes Absolute 1.9 (H) 0.0 - 0.9 thou/uL    Eosinophils Absolute 0.2 0.0 - 0.4 thou/uL    Basophils Absolute 0.2 0.0 - 0.2 thou/uL    Immature Granulocyte Absolute 0.6 (H) <=0.0 thou/uL     Results for orders placed or performed in visit on 02/12/21   Basic Metabolic Panel   Result Value Ref Range    Sodium 136 136 - 145 mmol/L    Potassium 4.6 3.5 - 5.0 mmol/L    Chloride 105 98 - 107 mmol/L    CO2 25 22 - 31 mmol/L    Anion Gap, Calculation 6 5 - 18 mmol/L    Glucose 102 70 - 125 mg/dL    Calcium 8.9 8.5 - 10.5 mg/dL    BUN 9 8 - 22 mg/dL    Creatinine 0.66 (L) 0.70 - 1.30 mg/dL    GFR MDRD Af Amer >60 >60 mL/min/1.73m2    GFR MDRD Non Af Amer >60 >60 mL/min/1.73m2         Lab Results   Component Value Date    WBC 15.1 (H) 02/12/2021    HGB 10.0 (L) 02/12/2021    HCT 32.1 (L) 02/12/2021    MCV 98 02/12/2021     (H) 02/12/2021       No results found for: [id number]  No results found for: HGBA1C  Lab Results   Component Value Date    INR 0.97 08/20/2012     No results found for: [id number]  No results found for: TSH        ASSESSMENT/PLAN:    S/p Bilateral transmetatarsal amputation, gastroc tendon lengthening bone biopsy, flap closure: wounds covered today. Dressing cares per surgical team. F/u with [facility] team on 2/15. pain controlled on tylenol, oxycodone, flexeril prn. Changed flexeril to scheduled at bedtime due to muscle cramping. Bmp, hm 1 2/12-stable.   Left proximal phalanx fracture: evaluated by plastic surgeon in hospital and recommended outpatient follow up. F/u in 1-2 weeks in clinic to evaluate for surgery.   Polyneuropathy: on gabapentin. No concerns.   Tobacco abuse: smoking cessation counseling today. Ordered nicotine patches. Reports no cravings.   Anxiety: on hydroxyzine prn, trazodone. Feels controlled today. Discussed with nursing to  "notify if not controlled.   H/o ETOH abuse, withdrawal seizures: denies any recent withdrawal symptoms. Discussed with nursing to notify for any signs of tremors, tachycardia, diaphoresis, etc.   Insomnia: on melatonin at bedtime, trazodone at bedtime.       Electronically signed by: Felicitas Zhao NP    Total floor/unit time spent 35 min with >50% time spent on counseling and coordination of care. Counseling regarding discharge instructions, pain management. Coordinated care with nursing, therapy,  for discharge planning, home health orders, wheelchair orders, shower bench, walker orders, med orders for discharge, specialty care follow ups.     Please evaluate Nacho Downey for admission to Home Health.    Face to Face Attestation and Initial Plan of Care    The face-to-face encounter occurred on date: 2/12/21  Face to Face encounter was with: Felicitas Zhao    Please provide brief clinical summary of reason for visit and need for home care. Deconditioning after hospital course for bilateral transmetatarsal amputations    Please identify which of the following home health disciplines the patient will need AND describe the skilled services that you would like the home health agency to perform: SKILLED NURSING (RN): perform/teach anticoagulation and teach wound care, PHYSICAL THERAPY: strength training and gait training and OCCUPATIONAL THERAPY: ADLs and home safety    Homebound Status (describe the functional limitations that support this patient is confined to his/her home. Medicaid recipients are not required to be homebound.):assistive device needed:  Wheelchair    Name of physician who will be responsible for the ongoing home health plan of care (CMS requires the referring physician to provide the specific name of the community physician instead of a title, such as \"PCP\"): Dio Mast MD    Requested Start of Care Date: Within 48 hours    Other information to assist the home health agency " in developing the initial Plan of Care:    I certify that services are/were furnished while this patient was under the care of a physician and that a physician or an allowed non-physician practitioner (NPP), had a face-to-face encounter that meets the physician face-to-face encounter requirements. The encounter was in whole, or in part, related to the primary reason for home health. The patient is confined to his/her home and needs intermittent skilled nursing, physical therapy, speech-language pathology, or the continued need for occupational therapy. A plan of care has been established by a physician and is periodically reviewed by a physician.    Post Discharge Medication Reconciliation Status: discharge medications reconciled, continue medications without change
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
## 2021-06-21 NOTE — LETTER
Letter by Felicitas Zhao NP at      Author: Felicitas Zhao NP Service: -- Author Type: --    Filed:  Encounter Date: 2/10/2021 Status: (Other)         [address] [address]                                  2021    Patient: Nacho Downey   MR Number: [id number]   YOB: 1985   Date of Visit: 2/10/2021     Dear Dr. Jarvis:    Thank you for referring Nacho Downey to me for evaluation. Below are the relevant portions of my assessment and plan of care.    If you have questions, please do not hesitate to call me. I look forward to following Nacho along with you.    Sincerely,        Felicitas Zhao NP          CC  No Recipients  Felicitas Zhao NP  2021  7:06 PM  Signed              [facility]    DATE: 2021    NAME:  Nacho Downey             :  1985  MRN: [id number]  CODE STATUS:  FULL CODE    VISIT TYPE: Problem Visit (admit tcu, foot surgery, pain check )     FACILITY:  [facility] [[id number]]       CHIEF COMPLAIN/REASON FOR VISIT:    Chief Complaint   Patient presents with   ? Problem Visit     admit tcu, foot surgery, pain check                HISTORY OF PRESENT ILLNESS: Nacho Downey is a 35 y.o. male who was admitted to [facility] - for bilateral transmetatarsal amputation, gastroc tendon lengthening bone biopsy, and flap closure for frostbite, gangrene, and osteomyelitis. Hospital course was unremarkable. He was also noted to have left proximal phalanx base fracture and plastic surgery was consulted. They recommended outpatient follow up and surgery. This was suspected to be an injury related to a fall on New years. He had worsening anxiety during hospital course. He was recommended trazodone and hydroxyzine and to follow up in few weeks for further med management. He did not have any signs of alcohol withdrawal during hospital course. He was started on habitrol patches for  nicotine replacement. He was recommended TCU transfer for further rehab. He has PMH of anxiety, polyneuropathy, ETOH abuse and withdrawal with seizures, tobacco abuse. Prior to this he lived at home.     Today Mr. Downey is seen for admit to tcu, foot surgery, and pain check. He is seen in bed today. He says he kicked off his leg elevating devices at some point during the night. He is supposed to sleep with his legs elevated but it isn't the most comfortable. He says he had some cramps in his calves during the night and has had these intermittently. He is having some pain in his feet and few phantom pains. He denies any tremors or shakiness. He is not having fever or chills. He denies any breathing concerns or cough. His bowels are moving fine and no urinary issues. He feels his anxiety is controlled. He is not having any dizziness or lightheadedness. He denies any other concerns today. He is eating well and having no nausea. He says he has not had a cigarette since the day before his surgery. He denies any cravings and is interested in continuing nicotine patches. We did discuss smoking cessation options today. He denies any alcohol withdrawal symptoms. Per staff he has been having some muscle cramping but otherwise vitals are stable. He is using tylenol and oxycodone for pain.     REVIEW OF SYSTEMS:  PROBLEMS AND REVIEW OF SYSTEMS:   Today on ROS:   Currently, no fever, chills, or rigors. Does not have any visual or hearing problems. Denies any chest pain, headaches, palpitations, lightheadedness, dizziness, shortness of breath, or cough. Appetite is good. Denies any GERD symptoms. Denies any difficulty with swallowing, nausea, or vomiting.  Denies any abdominal pain, diarrhea or constipation. Denies any urinary symptoms. No insomnia. No active bleeding. No rash. Positive for weakness, bilateral foot wounds covered, minimal leg swelling, pain controlled, muscle cramping, numb/tingling ble      Allergies    Allergen Reactions   ? Other Drug Allergy (See Comments)      Hx of prolong QT on EKG. Avoid QT prolonging meds or get EKG prior     Current Outpatient Medications   Medication Sig   ? cyclobenzaprine (FLEXERIL) 5 MG tablet Take 5 mg by mouth every 6 (six) hours as needed for muscle spasms.   ? acetaminophen (TYLENOL) 500 MG tablet Take 1,000 mg by mouth every 6 (six) hours as needed for pain.    ? gabapentin (NEURONTIN) 300 MG capsule Take 600 mg by mouth 3 (three) times a day.   ? hydrOXYzine pamoate (VISTARIL) 25 MG capsule Take 25-50 mg by mouth every 4 (four) hours as needed for anxiety.   ? melatonin 3 mg Tab tablet Take 6 mg by mouth at bedtime.   ? multivitamin with minerals tablet Take 1 tablet by mouth daily.   ? nicotine (NICODERM CQ) 14 mg/24 hr Place 1 patch on the skin daily.   ? oxyCODONE (ROXICODONE) 5 MG immediate release tablet Take 5 mg by mouth every 4 (four) hours as needed for pain.   ? rivaroxaban ANTICOAGULANT (XARELTO) 10 mg tablet Take 10 mg by mouth daily.   ? traZODone (DESYREL) 50 MG tablet Take 25 mg by mouth at bedtime.     Past Medical History:    Past Medical History:   Diagnosis Date   ? Acute osteomyelitis of right foot (H)    ? Alcohol abuse    ? Anxiety    ? Broken collarbone    ? Corneal abrasion    ? Gangrene (H)    ? Mandible fracture (H)    ? Open wound of left foot    ? Open wound of right foot    ? Polyneuropathy    ? Wound infection after surgery      Social History     Socioeconomic History   ? Marital status: Single     Spouse name: None   ? Number of children: None   ? Years of education: None   ? Highest education level: None   Occupational History   ? None   Social Needs   ? Financial resource strain: None   ? Food insecurity     Worry: None     Inability: None   ? Transportation needs     Medical: None     Non-medical: None   Tobacco Use   ? Smoking status: Current Some Day Smoker     Packs/day: 0.50     Types: Cigarettes   ? Smokeless tobacco: Never Used  "  Substance and Sexual Activity   ? Alcohol use: Yes     Alcohol/week: 12.0 standard drinks     Types: 12 Standard drinks or equivalent per week   ? Drug use: None   ? Sexual activity: None   Lifestyle   ? Physical activity     Days per week: None     Minutes per session: None   ? Stress: None   Relationships   ? Social connections     Talks on phone: None     Gets together: None     Attends [employer] service: None     Active member of club or organization: None     Attends meetings of clubs or organizations: None     Relationship status: None   ? Intimate partner violence     Fear of current or ex partner: None     Emotionally abused: None     Physically abused: None     Forced sexual activity: None   Other Topics Concern   ? Incontinent No   ? Toileting independently No   ? Cognitive impairment No   ? Vision impairment No   ? Hearing impairment No   ? Dentures No   Social History Narrative   ? None     Family history: see chart      PHYSICAL EXAMINATION  Vitals:    02/10/21 0407   BP: 97/57   Pulse: 73   Resp: 16   Temp: 97.4  F (36.3  C)   SpO2: 97%   Height: 6' 4\" (1.93 m)       Today on physical exam:     GENERAL: Awake, Alert, oriented x3, not in any form of acute distress, answers questions appropriately, follows simple commands, conversant, weakness  HEENT: Head is normocephalic with normal hair distribution. No evidence of trauma. Ears: No acute purulent discharge. Eyes: Conjunctivae pink with no scleral jaundice. Nose: Normal mucosa and septum. NECK: Supple with no cervical or supraclavicular lymphadenopathy. Trachea is midline.   CHEST: No tenderness or deformity, no crepitus  LUNG: dim to auscultation with good chest expansion. There are no crackles or wheezes, normal AP diameter. No shortness of breath or cough  BACK: No kyphosis of the thoracic spine. Symmetric, no curvature, ROM normal, no CVA tenderness, no spinal tenderness   CVS: There is good S1  S2, regular rhythm, there are no murmurs, rubs, " gallops, or heaves,  2+ pulses symmetric in all extremities.  ABDOMEN: Rounded and soft, nontender to palpation, non distended, no masses, no organomegaly, good bowel sounds, no rebound or guarding, no peritoneal signs.   EXTREMITIES: trace ble pedal edema, numb/tingling ble. Bilateral foot wounds covered  SKIN: Warm and dry, no erythema noted.  Skin color, texture, no rashes or lesions.  NEUROLOGICAL: The patient is oriented to person, place and time.             LABS:   Recent Results (from the past 168 hour(s))   Basic Metabolic Panel   Result Value Ref Range    Sodium 136 136 - 145 mmol/L    Potassium 4.6 3.5 - 5.0 mmol/L    Chloride 105 98 - 107 mmol/L    CO2 25 22 - 31 mmol/L    Anion Gap, Calculation 6 5 - 18 mmol/L    Glucose 102 70 - 125 mg/dL    Calcium 8.9 8.5 - 10.5 mg/dL    BUN 9 8 - 22 mg/dL    Creatinine 0.66 (L) 0.70 - 1.30 mg/dL    GFR MDRD Af Amer >60 >60 mL/min/1.73m2    GFR MDRD Non Af Amer >60 >60 mL/min/1.73m2   HM1 (CBC with Diff)   Result Value Ref Range    WBC 15.1 (H) 4.0 - 11.0 thou/uL    RBC 3.29 (L) 4.40 - 6.20 mill/uL    Hemoglobin 10.0 (L) 14.0 - 18.0 g/dL    Hematocrit 32.1 (L) 40.0 - 54.0 %    MCV 98 80 - 100 fL    MCH 30.4 27.0 - 34.0 pg    MCHC 31.2 (L) 32.0 - 36.0 g/dL    RDW 14.3 11.0 - 14.5 %    Platelets 546 (H) 140 - 440 thou/uL    MPV 9.6 8.5 - 12.5 fL    Neutrophils % 59 50 - 70 %    Lymphocytes % 22 20 - 40 %    Monocytes % 12 (H) 2 - 10 %    Eosinophils % 2 0 - 6 %    Basophils % 1 0 - 2 %    Immature Granulocyte % 4 (H) <=0 %    Neutrophils Absolute 8.8 (H) 2.0 - 7.7 thou/uL    Lymphocytes Absolute 3.4 0.8 - 4.4 thou/uL    Monocytes Absolute 1.9 (H) 0.0 - 0.9 thou/uL    Eosinophils Absolute 0.2 0.0 - 0.4 thou/uL    Basophils Absolute 0.2 0.0 - 0.2 thou/uL    Immature Granulocyte Absolute 0.6 (H) <=0.0 thou/uL     No results found for this or any previous visit.      Lab Results   Component Value Date    WBC 15.1 (H) 02/12/2021    HGB 10.0 (L) 02/12/2021    HCT 32.1 (L)  02/12/2021    MCV 98 02/12/2021     (H) 02/12/2021       No results found for: [id number]  No results found for: HGBA1C  Lab Results   Component Value Date    INR 0.97 08/20/2012     No results found for: [id number]  No results found for: TSH        ASSESSMENT/PLAN:    S/p Bilateral transmetatarsal amputation, gastroc tendon lengthening bone biopsy, flap closure: wounds covered today. Dressing cares per surgical team. F/u with [facility] team on 2/15. Discussed pain regimen with him and feels pain controlled on tylenol, oxycodone. Some muscle cramping, will add flexeril prn. Bmp, hm 1 ordered for 2/12.   Left proximal phalanx fracture: evaluated by plastic surgeon in hospital and recommended outpatient follow up. F/u in 1-2 weeks in clinic to evaluate for surgery.   Polyneuropathy: on gabapentin. Reports at baseline.   Tobacco abuse: smoking cessation counseling today. Ordered nicotine patches. Reports no cravings.   Anxiety: on hydroxyzine prn, trazodone. Feels controlled today. Discussed with nursing to notify if not controlled.   H/o ETOH abuse, withdrawal seizures: denies any recent withdrawal symptoms. Discussed with nursing to notify for any signs of tremors, tachycardia, diaphoresis, etc.   Insomnia: on melatonin at bedtime, trazodone at bedtime.       Electronically signed by: Felicitas Zhao NP    Total floor/unit time spent 47 min with >50% time spent on counseling and coordination of care. Counseling was done regarding pain management. Coordination of care with nursing for management of alcohol withdrawal. Additional 8 min spent on tobacco abuse counseling with discussion of med management, behavioral modifications, identifying triggers, nicotine replacement options.

## 2021-06-23 PROBLEM — S62.619A PROXIMAL PHALANX FRACTURE OF FINGER: Status: ACTIVE | Noted: 2021-02-13

## 2021-06-23 PROBLEM — L03.119 CELLULITIS OF FOOT: Status: ACTIVE | Noted: 2021-03-01

## 2021-06-23 PROBLEM — E55.9 VITAMIN D DEFICIENCY: Status: ACTIVE | Noted: 2021-01-01

## 2021-06-23 PROBLEM — M86.9 OSTEOMYELITIS (H): Status: ACTIVE | Noted: 2021-03-18

## 2021-06-23 PROBLEM — S32.010A CLOSED COMPRESSION FRACTURE OF BODY OF L1 VERTEBRA (H): Status: ACTIVE | Noted: 2021-01-01

## 2021-06-23 PROBLEM — S32.019D CLOSED FRACTURE OF FIRST LUMBAR VERTEBRA WITH ROUTINE HEALING: Status: ACTIVE | Noted: 2021-01-01

## 2021-06-23 PROBLEM — I96 GANGRENE (H): Status: ACTIVE | Noted: 2021-02-02

## 2021-06-23 PROBLEM — G47.00 INSOMNIA: Status: ACTIVE | Noted: 2021-02-13

## 2021-06-23 PROBLEM — Z89.439: Status: ACTIVE | Noted: 2021-02-13

## 2021-06-23 PROBLEM — T33.821A FROSTBITE OF BOTH FEET: Status: ACTIVE | Noted: 2021-01-06

## 2021-06-23 PROBLEM — L02.619 ABSCESS OF FOOT: Status: ACTIVE | Noted: 2021-03-01

## 2021-06-23 PROBLEM — R29.6 MULTIPLE FALLS: Status: ACTIVE | Noted: 2021-01-01

## 2021-06-23 PROBLEM — R53.1 GENERALIZED WEAKNESS: Status: ACTIVE | Noted: 2021-01-01

## 2021-06-23 PROBLEM — F17.200 TOBACCO USE DISORDER: Status: ACTIVE | Noted: 2021-01-01

## 2021-06-23 PROBLEM — T33.822A FROSTBITE OF BOTH FEET: Status: ACTIVE | Noted: 2021-01-06

## 2021-06-23 PROBLEM — F41.9 ANXIETY: Status: ACTIVE | Noted: 2018-10-17

## 2021-06-23 NOTE — PROGRESS NOTES
"[facility] GERIATRIC SERVICES  Primary Care Provider & Clinic: [facility], [address] / [facility] [address]  Chief Complaint   Patient presents with     Hospital F/U     [facility] 6/18/2021 - 6/23/2021     [facility] Medical Record Number: [id number]  Place of Service Where Encounter Took Place: [facility] ([facility]) [[id number]]    Nacho Downey is a 35 year old (1985), admitted to the above facility from  [facility] . Hospital stay 6/18/21 through 6/23/21.    HPI:    Nacho Downey is a 35 year old male with PMH of ETOH abuse, s/p bilateral transmetatarsal amputation (1/2021) complicated by osteomyelitis of the left foot (3/2021), polyneuropathy who presented with back pain after a fall while intoxicated. Had been previously diagnosed with L1 compression fracture on 6/15/21, and he was discharged to TCU on 6/17, but left after one hour due to \"terrible\" conditions. As he left with no pain medication returned to ED on 6/18 with worsening pain and difficulty walking. Was evaluated by Neurosurgery, felt fracture was actually an L1 chance fracture and recommended TLSO when OOB, follow-up in 2 weeks. New TCU placement was found and when medically stable he was discharged to TCU for further rehab and medical management.     Seen today in room after working with OT. He feels it went well. He reports mild pain in his back, has been trying to limit oxycodone use and mostly just use APAP for pain. Feels TLSO brace has been helpful with pain. He denies any SOB. Right upper chest pain he has had since the fall is improving. Does have chronic tingling in his feet. Appetite is ok, but he is not a fan of the food. Having bowel movements. He has been going outside to smoke, states he wishes no one would have told him he could do that because otherwise he likely would have just used th gum - wants to quite because he wants the best chance of healing. States he had " stopped while his feet were healing, but then resumed once he had recovered. He is also hoping to speak with the  today- he wants to discharge to [place] for substance abuse treatment as soon as a bed opens up as he will be hiram to continue with PT and OT there.     CODE STATUS/ADVANCE DIRECTIVES DISCUSSION: Full Code CPR/Full code, Advance Directives: NO  Patient's living condition: lives with family, father   ALLERGIES: No Known Allergies   PAST MEDICAL HISTORY: No past medical history on file.   PAST SURGICAL HISTORY:  has no past surgical history on file.  FAMILY HISTORY: family history is not on file.  SOCIAL HISTORY:      Post Discharge Medication Reconciliation Status: discharge medications reconciled and changed, per note/orders  Current Outpatient Medications   Medication Sig     acetaminophen (TYLENOL) 325 MG tablet Take 650 mg by mouth every 4 hours as needed for mild pain     folic acid (FOLVITE) 1 MG tablet Take 1 mg by mouth daily     gabapentin (NEURONTIN) 600 MG tablet Take 1,200 mg by mouth 3 times daily     Lidocaine (LIDOCARE) 4 % Patch Place 1 patch onto the skin every 24 hours To prevent lidocaine toxicity, patient should be patch free for 12 hrs daily.     melatonin 3 MG tablet Take 6 mg by mouth At Bedtime     methocarbamol (ROBAXIN) 500 MG tablet Take 500 mg by mouth every 6 hours     nicotine (NICORETTE) 2 MG gum Place 2 mg inside cheek every 2 hours as needed for smoking cessation     oxyCODONE (ROXICODONE) 5 MG tablet Take 5 mg by mouth every 4 hours as needed for moderate pain     vitamin D2 (ERGOCALCIFEROL) 08813 units (1250 mcg) capsule Take 50,000 Units by mouth once a week Every Thursday     No current facility-administered medications for this visit.      ROS:  4 point ROS including Respiratory, CV, GI and , other than that noted in the HPI,  is negative    Vitals:  /80   Pulse 96   Temp 97.5  F (36.4  C)   Resp 18   Ht 1.829 m (6')   Wt 83.5 kg (184 lb)    SpO2 98%   BMI 24.95 kg/m    Exam:  GENERAL APPEARANCE:  Alert, in no distress, oriented, cooperative  RESP:  lungs clear to auscultation , no respiratory distress  CV:  regular rate and rhythm, no murmur, rub, or gallop, no edema  ABDOMEN:  MACY as TLSO in place  M/S:   TLSO brace in place, bilat metatarsal amputation  SKIN:  no obvoius wounds or lesions, exam limited adn fully clothed and TLSO brace in place  NEURO:   Decreased sensaion bilat feet, CN 2-12 intact  PSYCH:  oriented X 3, normal insight, judgement and memory, affect and mood normal    Lab/Diagnostic data:  Recent labs in [facility] reviewed by me today.     ASSESSMENT/PLAN:  Other closed fracture of first lumbar vertebra with routine healing, subsequent encounter  Fall down stairs, subsequent encounter  Physical deconditioning  L1 chance fracture. Neurosurgery recommended non operative management. Pain controlled. Reviewed with patient.   - Continue analgesia with APAP 650mg q4h PRN, oxycodone 5mg q4h PRN, methocarbamol 500mg q6h PRN, lidocaine 4% to low back daily  - TSLO when OOB as fracture has potential to become unstable.  - follow-up with neurosurgery on 6/30 as scheduled for repeat imaging  - PT/OT eval and treat. Discharge planning per their recommendation    History of transmetatarsal amputation of foot (H)  Polyneuropathy  Limits mobility, reviewed with patient.   - continue gabapentin 1200mg TID,     ETOH abuse  Required ativan for withdrawal during 6/15 hospitalization, has been sober since then. Previously would binge drink up to 1.75L of vodka daily.  Plans to discharge to [place] for substance abuse treatment as soon as bed available. Discussed with patient  -  to assist with discharge planning- reviewed plan to discharge to [place] with in house .   - repeat BMP, Mag level on 6/28 to monitor for stability     Tobacco dependence syndrome  Reviewed with patient - he does report desire to quite  - encourage patient to  continue to use Nicorette gum, instructed not to chew gum and smoke at the same time.     Vitamin D deficiency  Critically low at 3.34. Reviewed with patient  - continue vitamin D 50,000 units weekly x 8 weeks, then re-assess levels.     Slow transit constipation  Reports having regular bowel movements - is at risk for constipation given narcotics and limit mobility  - monitor.     Insomnia, unspecified type  Reports difficulty sleeping. Reviewed medications with patient  - increase melatonin from 3mg to 6mg at bedtime, monitor and adjust        Orders:  1 Increase melatonin to 6mg at bedtime  2. BMP, Mg level on 6/28 DX ETOH abuse    Total time spent with patient visit at the skilled nursing facility was 35 min including patient visit and review of past records. Greater than 50% of total time spent with counseling and coordinating care due to review of medications, discussion fo plan of care and patient education d/t fall with L1 fracture, pain management, ETOH use and discussion of discharge to [place] with patient and , insomnia, tobacco use, metatarsal amputation, neuropathy and physical deconditioning .     Electronically signed by: JOSÉ LUIS Gaspar CNP

## 2021-06-25 NOTE — ED TRIAGE NOTES
Pt to the ED on his own power - his father dropped him off - pt states he fell down the stairs multiple times since Sunday. Unknown if he blacked out - pt has multiple bruises of multiple healing stages - including under his left eye and bridge of nose. He c/o nausea/vomiting. Pts father most concerned as he fell today and put a large whole in the dry wall. Trauma alert, called as unknown injury from unwitnessed falls. Pts last drink was this evening to prevent withdrawal.

## 2021-06-25 NOTE — LETTER
"    6/25/2021        RE: Nacho Downey  [address] [address]        [facility] GERIATRIC [facility]  Primary Care Provider & Clinic: [facility], [address] / [place] [facility] [address]  Chief Complaint   Patient presents with     Hospital F/U     [facility] 6/18/2021 - 6/23/2021     [facility] Medical Record Number: [id number]  Place of Service Where Encounter Took Place: [facility] ([facility]) [[id number]]    Nacho Downey is a 35 year old (1985), admitted to the above facility from  [facility] . Hospital stay 6/18/21 through 6/23/21.    HPI:    Nacho Downey is a 35 year old male with PMH of ETOH abuse, s/p bilateral transmetatarsal amputation (1/2021) complicated by osteomyelitis of the left foot (3/2021), polyneuropathy who presented with back pain after a fall while intoxicated. Had been previously diagnosed with L1 compression fracture on 6/15/21, and he was discharged to TCU on 6/17, but left after one hour due to \"terrible\" conditions. As he left with no pain medication returned to ED on 6/18 with worsening pain and difficulty walking. Was evaluated by Neurosurgery, felt fracture was actually an L1 chance fracture and recommended TLSO when OOB, follow-up in 2 weeks. New TCU placement was found and when medically stable he was discharged to TCU for further rehab and medical management.     Seen today in room after working with OT. He feels it went well. He reports mild pain in his back, has been trying to limit oxycodone use and mostly just use APAP for pain. Feels TLSO brace has been helpful with pain. He denies any SOB. Right upper chest pain he has had since the fall is improving. Does have chronic tingling in his feet. Appetite is ok, but he is not a fan of the food. Having bowel movements. He has been going outside to smoke, states he wishes no one would have told him he could do that because otherwise he likely would have just " used th gum - wants to quite because he wants the best chance of healing. States he had stopped while his feet were healing, but then resumed once he had recovered. He is also hoping to speak with the  today- he wants to discharge to [place] for substance abuse treatment as soon as a bed opens up as he will be hiram to continue with PT and OT there.     CODE STATUS/ADVANCE DIRECTIVES DISCUSSION: Full Code CPR/Full code, Advance Directives: NO  Patient's living condition: lives with family, father   ALLERGIES: No Known Allergies   PAST MEDICAL HISTORY: No past medical history on file.   PAST SURGICAL HISTORY:  has no past surgical history on file.  FAMILY HISTORY: family history is not on file.  SOCIAL HISTORY:      Post Discharge Medication Reconciliation Status: discharge medications reconciled and changed, per note/orders  Current Outpatient Medications   Medication Sig     acetaminophen (TYLENOL) 325 MG tablet Take 650 mg by mouth every 4 hours as needed for mild pain     folic acid (FOLVITE) 1 MG tablet Take 1 mg by mouth daily     gabapentin (NEURONTIN) 600 MG tablet Take 1,200 mg by mouth 3 times daily     Lidocaine (LIDOCARE) 4 % Patch Place 1 patch onto the skin every 24 hours To prevent lidocaine toxicity, patient should be patch free for 12 hrs daily.     melatonin 3 MG tablet Take 6 mg by mouth At Bedtime     methocarbamol (ROBAXIN) 500 MG tablet Take 500 mg by mouth every 6 hours     nicotine (NICORETTE) 2 MG gum Place 2 mg inside cheek every 2 hours as needed for smoking cessation     oxyCODONE (ROXICODONE) 5 MG tablet Take 5 mg by mouth every 4 hours as needed for moderate pain     vitamin D2 (ERGOCALCIFEROL) 51138 units (1250 mcg) capsule Take 50,000 Units by mouth once a week Every Thursday     No current facility-administered medications for this visit.      ROS:  4 point ROS including Respiratory, CV, GI and , other than that noted in the HPI,  is negative    Vitals:  /80   Pulse  96   Temp 97.5  F (36.4  C)   Resp 18   Ht 1.829 m (6')   Wt 83.5 kg (184 lb)   SpO2 98%   BMI 24.95 kg/m    Exam:  GENERAL APPEARANCE:  Alert, in no distress, oriented, cooperative  RESP:  lungs clear to auscultation , no respiratory distress  CV:  regular rate and rhythm, no murmur, rub, or gallop, no edema  ABDOMEN:  MACY as TLSO in place  M/S:   TLSO brace in place, bilat metatarsal amputation  SKIN:  no obvoius wounds or lesions, exam limited adn fully clothed and TLSO brace in place  NEURO:   Decreased sensaion bilat feet, CN 2-12 intact  PSYCH:  oriented X 3, normal insight, judgement and memory, affect and mood normal    Lab/Diagnostic data:  Recent labs in [facility] reviewed by me today.     ASSESSMENT/PLAN:  Other closed fracture of first lumbar vertebra with routine healing, subsequent encounter  Fall down stairs, subsequent encounter  Physical deconditioning  L1 chance fracture. Neurosurgery recommended non operative management. Pain controlled. Reviewed with patient.   - Continue analgesia with APAP 650mg q4h PRN, oxycodone 5mg q4h PRN, methocarbamol 500mg q6h PRN, lidocaine 4% to low back daily  - TSLO when OOB as fracture has potential to become unstable.  - follow-up with neurosurgery on 6/30 as scheduled for repeat imaging  - PT/OT eval and treat. Discharge planning per their recommendation    History of transmetatarsal amputation of foot (H)  Polyneuropathy  Limits mobility, reviewed with patient.   - continue gabapentin 1200mg TID,     ETOH abuse  Required ativan for withdrawal during 6/15 hospitalization, has been sober since then. Previously would binge drink up to 1.75L of vodka daily.  Plans to discharge to [place] for substance abuse treatment as soon as bed available. Discussed with patient  -  to assist with discharge planning- reviewed plan to discharge to [place] with in house .   - repeat BMP, Mag level on 6/28 to monitor for stability     Tobacco dependence  syndrome  Reviewed with patient - he does report desire to quite  - encourage patient to continue to use Nicorette gum, instructed not to chew gum and smoke at the same time.     Vitamin D deficiency  Critically low at 3.34. Reviewed with patient  - continue vitamin D 50,000 units weekly x 8 weeks, then re-assess levels.     Slow transit constipation  Reports having regular bowel movements - is at risk for constipation given narcotics and limit mobility  - monitor.     Insomnia, unspecified type  Reports difficulty sleeping. Reviewed medications with patient  - increase melatonin from 3mg to 6mg at bedtime, monitor and adjust        Orders:  1 Increase melatonin to 6mg at bedtime  2. BMP, Mg level on 6/28 DX ETOH abuse    Total time spent with patient visit at the skilled nursing facility was 35 min including patient visit and review of past records. Greater than 50% of total time spent with counseling and coordinating care due to review of medications, discussion fo plan of care and patient education d/t fall with L1 fracture, pain management, ETOH use and discussion of discharge to [place] with patient and , insomnia, tobacco use, metatarsal amputation, neuropathy and physical deconditioning .     Electronically signed by: JOSÉ LUIS Gaspar CNP        Sincerely,        JOSÉ LUIS Gaspar CNP

## 2021-06-26 NOTE — ED PROVIDER NOTES
EMERGENCY DEPARTMENT ENCOUNTER      NAME: Nacho Downey  AGE: 35 y.o. male  YOB: 1985  MRN: [id number]  EVALUATION DATE & TIME: 6/15/2021 12:32 AM    PCP: Dio Mast MD    ED PROVIDER: ROMARIO Eric    Chief Complaint   Patient presents with     Fall     Alcohol Problem     FINAL IMPRESSION:  1. Hypokalemia    2. Hypomagnesemia    3. Alcohol withdrawal syndrome without complication (H)    4. Alcohol abuse    5. Generalized muscle weakness    6. Multiple falls        ED COURSE & MEDICAL DECISION MAKING:    Pertinent Labs & Imaging studies reviewed. (See chart for details)    12:37 AM I met with patient for initial interview and exam. PPE used includes surgical mask, gloves.    35 y.o. male presents to the Emergency Department for evaluation of weakness multiple falls alcohol related issues.  Patient with past medical history of severe alcohol use disorder.  Reports has been trying to detox.  He has had increased weakness tremor and fatigue.  He has had multiple falls over the past several days.  Reports last drink earlier today.  Arrives to the emergency department for assessment.  On examination patient was cachectic adult male with notable multiple bruises across his body anterior face back arms chest abdomen legs primarily complaining of pain in his lower back.  He had a headache.  Appeared to be in withdrawal on examination.  ECG obtained at arrival demonstrating sinus tachycardia with a heart rate of 126 occasional premature ventricular complexes.  Actively vomiting on arrival to the emergency department.  IV was established she was initiated on intravenous fluids.  Received lorazepam for management of acute withdrawal.  Currently awaiting laboratory testing and CT scan imaging for further assessment of his clinical condition.    1:33 AM  Laboratory testing demonstrating an alcohol level of 34.  Magnesium 1.6 replacement initiated.  Potassium 3.3 replacement initiated.  Continuing  patient on fluid resuscitation.  Monitoring for improvement to his symptoms.  Awaiting traumatic evaluation via CT scan.    1:57 AM  Awaiting CT scan results for trauma evaluation due to multiple falls.  Treating ETOH withdrawal.  Replacing electrolytes and fluids.  Patient at this point in care signed out to oncoming MD.  Disposition dependent on CT scan results and response to treamtent.    MEDICATIONS GIVEN IN THE EMERGENCY:  Medications   sodium chloride flush 10 mL (NS) (has no administration in time range)   iopamidol solution 100 mL (ISOVUE-370) (has no administration in time range)   magnesium oxide tablet 400 mg (MAG-OX) (has no administration in time range)   sodium chloride 0.9% 1,000 mL (1,000 mL Intravenous New Bag 6/15/21 0100)   multivitamin therapeutic tablet 1 tablet (1 tablet Oral Given 6/15/21 0122)   thiamine tablet 50 mg (50 mg Oral Given 6/15/21 0123)   folic acid tablet 1 mg (FOLVITE) (1 mg Oral Given 6/15/21 0122)   LORazepam 2 mg injection (diluted concentration) (2 mg Intravenous Given 6/15/21 0059)       NEW PRESCRIPTIONS STARTED AT TODAY'S ER VISIT  Current Discharge Medication List      CONTINUE these medications which have NOT CHANGED    Details   acetaminophen (TYLENOL) 500 MG tablet Take 1,000 mg by mouth every 6 (six) hours as needed for pain.       cyclobenzaprine (FLEXERIL) 5 MG tablet Take 5 mg by mouth every 6 (six) hours as needed for muscle spasms. And at bedtime scheduled      gabapentin (NEURONTIN) 300 MG capsule Take 600 mg by mouth 3 (three) times a day.      hydrOXYzine pamoate (VISTARIL) 25 MG capsule Take 25-50 mg by mouth every 4 (four) hours as needed for anxiety.      melatonin 3 mg Tab tablet Take 6 mg by mouth at bedtime.      multivitamin with minerals tablet Take 1 tablet by mouth daily.      nicotine (NICODERM CQ) 14 mg/24 hr Place 1 patch on the skin daily.      oxyCODONE (ROXICODONE) 5 MG immediate release tablet Take 1 tablet (5 mg total) by mouth every 4  (four) hours as needed for pain.  Qty: 30 tablet, Refills: 0    Associated Diagnoses: History of transmetatarsal amputation of foot (H)      rivaroxaban ANTICOAGULANT (XARELTO) 10 mg tablet Take 10 mg by mouth daily.      traZODone (DESYREL) 50 MG tablet Take 25 mg by mouth at bedtime.                =================================================================    HPI    Patient information was obtained from: patient     Use of Intrepreter: N/A     Nacho Downey is a 35 y.o. male with a history of alcohol abuse, alcohol withdrawal, anxiety, and s/p transmetatarsal amputation of foot who presents to the ED by walk in for evaluation of fall.    Patient was had multiple falls in the past couple of days. He has been drinking and doesn't know if there was a loss of consciousness. He has fallen down the stairs and today he put a big hole in the drywall at his dad's house. Patient reports pain everywhere and worse in the back. He also reports headache and nausea with an emesis episode during initial examination. There are bruises on his back, legs, arms, and face with unknown mechanisms of the injury. Patient's last drink was around 6:00 PM yesterday and he reports a history of withdrawal. He has an appointment with a treatment center on 6/17. Denies chest pain, abdominal pain, suicidal ideation and any other complaints.    REVIEW OF SYSTEMS   Review of Systems   Constitutional:        Positive for falls.   Cardiovascular: Negative for chest pain.   Gastrointestinal: Positive for nausea and vomiting. Negative for abdominal pain.   Musculoskeletal: Positive for back pain.        Positive for pain everywhere.   Skin:        Positive for multiple bruises on the arms, legs, face, and back.   Neurological: Positive for headaches.   Psychiatric/Behavioral: Negative for suicidal ideas.   All other systems reviewed and are negative.       PAST MEDICAL HISTORY:  Past Medical History:   Diagnosis Date     Acute osteomyelitis of  right foot (H)      Alcohol abuse      Anxiety      Broken collarbone      Corneal abrasion      Gangrene (H)      Mandible fracture (H)      Open wound of left foot      Open wound of right foot      Polyneuropathy      Wound infection after surgery        PAST SURGICAL HISTORY:  Past Surgical History:   Procedure Laterality Date     EYE SURGERY Right      INCISION AND DRAINAGE FOOT  02/05/2021    INCISION AND DRAINAGE WITH PARTIAL FOOT AMPUTATION, tendon LENGTHENING, bone biopsy, flap closure - BILATERAL FEET     INGUINAL HERNIA REPAIR Left      MANDIBLE SURGERY       QUADRICEPS TENDON REPAIR Right            CURRENT MEDICATIONS:    No current facility-administered medications on file prior to encounter.      Current Outpatient Medications on File Prior to Encounter   Medication Sig     acetaminophen (TYLENOL) 500 MG tablet Take 1,000 mg by mouth every 6 (six) hours as needed for pain.      cyclobenzaprine (FLEXERIL) 5 MG tablet Take 5 mg by mouth every 6 (six) hours as needed for muscle spasms. And at bedtime scheduled     gabapentin (NEURONTIN) 300 MG capsule Take 600 mg by mouth 3 (three) times a day.     hydrOXYzine pamoate (VISTARIL) 25 MG capsule Take 25-50 mg by mouth every 4 (four) hours as needed for anxiety.     melatonin 3 mg Tab tablet Take 6 mg by mouth at bedtime.     multivitamin with minerals tablet Take 1 tablet by mouth daily.     nicotine (NICODERM CQ) 14 mg/24 hr Place 1 patch on the skin daily.     oxyCODONE (ROXICODONE) 5 MG immediate release tablet Take 1 tablet (5 mg total) by mouth every 4 (four) hours as needed for pain.     rivaroxaban ANTICOAGULANT (XARELTO) 10 mg tablet Take 10 mg by mouth daily.     traZODone (DESYREL) 50 MG tablet Take 25 mg by mouth at bedtime.       ALLERGIES:  Allergies   Allergen Reactions     Other Drug Allergy (See Comments)      Hx of prolong QT on EKG. Avoid QT prolonging meds or get EKG prior       FAMILY HISTORY:  Family History   Problem Relation Age of  Onset     Ovarian cancer Mother        SOCIAL HISTORY:   Social History     Socioeconomic History     Marital status: Single     Spouse name: Not on file     Number of children: Not on file     Years of education: Not on file     Highest education level: Not on file   Occupational History     Not on file   Social Needs     Financial resource strain: Not on file     Food insecurity     Worry: Not on file     Inability: Not on file     Transportation needs     Medical: Not on file     Non-medical: Not on file   Tobacco Use     Smoking status: Current Some Day Smoker     Packs/day: 0.50     Types: Cigarettes     Smokeless tobacco: Never Used   Substance and Sexual Activity     Alcohol use: Yes     Alcohol/week: 12.0 standard drinks     Types: 12 Standard drinks or equivalent per week     Drug use: Not on file     Sexual activity: Not on file   Lifestyle     Physical activity     Days per week: Not on file     Minutes per session: Not on file     Stress: Not on file   Relationships     Social connections     Talks on phone: Not on file     Gets together: Not on file     Attends Buddhist service: Not on file     Active member of club or organization: Not on file     Attends meetings of clubs or organizations: Not on file     Relationship status: Not on file     Intimate partner violence     Fear of current or ex partner: Not on file     Emotionally abused: Not on file     Physically abused: Not on file     Forced sexual activity: Not on file   Other Topics Concern     Incontinent No     Toileting independently No     Cognitive impairment No     Vision impairment No     Hearing impairment No     Dentures No   Social History Narrative     Not on file       VITALS:  Patient Vitals for the past 24 hrs:   BP Pulse Resp SpO2   06/15/21 0039 (!) 166/106 (!) 120 20 98 %       PHYSICAL EXAM    Constitutional:   Cachectic, chronically ill-appearing adult male, appears older than stated age.  HENT:  Contusion of various ages  anterior face worse on left side associated tenderness to palpation.  Neck: Normal range of motion tenderness palpation of the posterior aspect of the neck.  Eyes: Conjunctiva normal, No discharge.   Respiratory:  Normal breath sounds, No respiratory distress, No wheezing.  No cough.  Cardiovascular:   Tachycardic no murmur.  GI:   Fuhs mild nonfocal abdominal pain to palpation.  : No CVA tenderness  Musculoskeletal: Scattered contusions no focal extremity tenderness.  Distal perfusion intact.  Effusions overlying lumbar spine with associated tenderness to palpation.  Integument:   As noted above multiple scattered contusions throughout.  Neurologic:  Alert & oriented.  No focal deficits appreciated.    Ambulatory at this time due to his acute medical condition.  Psychiatric:   Flat affect, denies suicidal ideation    LAB:  All pertinent labs reviewed and interpreted.  Results for orders placed or performed during the hospital encounter of 06/15/21   Magnesium   Result Value Ref Range    Magnesium 1.6 (L) 1.8 - 2.6 mg/dL   Comprehensive Metabolic Panel   Result Value Ref Range    Sodium 142 136 - 145 mmol/L    Potassium 3.3 (L) 3.5 - 5.0 mmol/L    Chloride 105 98 - 107 mmol/L    CO2 20 (L) 22 - 31 mmol/L    Anion Gap, Calculation 17 5 - 18 mmol/L    Glucose 103 70 - 125 mg/dL    BUN 7 (L) 8 - 22 mg/dL    Creatinine 0.64 (L) 0.70 - 1.30 mg/dL    GFR MDRD Af Amer >60 >60 mL/min/1.73m2    GFR MDRD Non Af Amer >60 >60 mL/min/1.73m2    Bilirubin, Total 0.8 0.0 - 1.0 mg/dL    Calcium 8.7 8.5 - 10.5 mg/dL    Protein, Total 6.5 6.0 - 8.0 g/dL    Albumin 3.6 3.5 - 5.0 g/dL    Alkaline Phosphatase 120 45 - 120 U/L    AST 49 (H) 0 - 40 U/L    ALT 31 0 - 45 U/L   Alcohol, Ethyl, Blood   Result Value Ref Range    Alcohol, Blood 34 (H) None detected mg/dL   INR   Result Value Ref Range    INR 1.02 0.90 - 1.10   Asymptomatic SARS-CoV-2 (COVID-19)-PCR    Specimen: Respiratory   Result Value Ref Range    SARS-CoV-2 PCR Result  Negative Negative, Invalid   HM1 (CBC with Diff)   Result Value Ref Range    WBC 11.6 (H) 4.0 - 11.0 thou/uL    RBC 4.29 (L) 4.40 - 6.20 mill/uL    Hemoglobin 13.8 (L) 14.0 - 18.0 g/dL    Hematocrit 41.0 40.0 - 54.0 %    MCV 96 80 - 100 fL    MCH 32.2 27.0 - 34.0 pg    MCHC 33.7 32.0 - 36.0 g/dL    RDW 15.9 (H) 11.0 - 14.5 %    Platelets 260 140 - 440 thou/uL    MPV 9.7 8.5 - 12.5 fL    Neutrophils % 72 (H) 50 - 70 %    Lymphocytes % 21 20 - 40 %    Monocytes % 6 2 - 10 %    Eosinophils % 0 0 - 6 %    Basophils % 1 0 - 2 %    Immature Granulocyte % 1 (H) <=0 %    Neutrophils Absolute 8.3 (H) 2.0 - 7.7 thou/uL    Lymphocytes Absolute 2.4 0.8 - 4.4 thou/uL    Monocytes Absolute 0.7 0.0 - 0.9 thou/uL    Eosinophils Absolute 0.0 0.0 - 0.4 thou/uL    Basophils Absolute 0.1 0.0 - 0.2 thou/uL    Immature Granulocyte Absolute 0.1 (H) <=0.0 thou/uL   ECG 12 lead nursing unit performed   Result Value Ref Range    SYSTOLIC BLOOD PRESSURE 152 mmHg    DIASTOLIC BLOOD PRESSURE 99 mmHg    VENTRICULAR RATE 126 BPM    ATRIAL RATE 126 BPM    P-R INTERVAL 142 ms    QRS DURATION 84 ms    Q-T INTERVAL 322 ms    QTC CALCULATION (BEZET) 466 ms    P Axis 61 degrees    R AXIS 36 degrees    T AXIS 56 degrees    MUSE DIAGNOSIS       Sinus tachycardia with occasional Premature ventricular complexes  Possible Inferior infarct , age undetermined  Anterior infarct , age undetermined  Abnormal ECG  When compared with ECG of 20-OCT-2020 17:23,  Premature ventricular complexes are now Present  Anterior infarct is now Present  Borderline criteria for Inferior infarct are now Present  Confirmed by SEE ED PROVIDER NOTE FOR, ECG INTERPRETATION (4000),  LORAINE MENESES ([id number]) on 6/15/2021 1:08:06 AM         RADIOLOGY:  Reviewed all pertinent imaging. Please see official radiology report.  No results found.    EKG:    Performed at: 0056  Sinus tachycardia  Heart rate 126  Occasional PVC  ST segments appear nonischemic  In comparison to ECG from  October 2020 tachycardia has developed  Clinical impression: Sinus tachycardia with premature ventricular complexes.    I have independently reviewed and interpreted the EKG(s) documented above.    I, Keya Soares, am serving as a scribe to document services personally performed by Dr. Eric based on my observation and the provider's statements to me. I, Tono Eric MD attest that Keya Soares is acting in a scribe capacity, has observed my performance of the services and has documented them in accordance with my direction.    Tono Eric M.D.  Emergency Medicine  [facility] EMERGENCY ROOM  [address] [facility] [facility] [address]  Dept: [phone]  Loc: [phone]       Tono Eric MD  06/15/21 0159

## 2021-06-26 NOTE — ED PROVIDER NOTES
eMERGENCY dEPARTMENT PROGRESS NOTE         ED COURSE AND MEDICAL DECISION MAKING  2:15 AM Patient was signed out to me by Tono Eric pending CT scan results and response to treatment.    3:15 AM Introduced myself to patient and updated him on his results.   3:32 AM [place] had a bed for patient. Patient is agreeable with transfer to [facility]. [facility] hospitalist.   3:43 AM Spoke with Dr. Mckeon, [facility] hospitalist, who agrees to accept patient for admission.    Nacho Downey is a 35 y.o. male who presents with multiple falls the past couple of days. Patient has been drinking and does not know if there were any syncopal episodes. He has fallen down the stairs and has put a big hole in the drywall at his dad's house. Patient reports pain everywhere, worse in the back. He additionally endorses headache, nausea, and emesis. Patient has bruising to the his back , legs, arms, and face with unknown mechanisms of injury. Patient's last drink was around 1800 yesterday and has history of withdrawal. Patient has an appointment on 06/17/2021 with the treatment center. Patient denies any additional complaints at this time.      CT scans only show a L1 compression fracture.  Normal neurologic exam.  Discussed findings with patient and his father.  Given his alcohol withdrawal and concern of multiple falls patient will be admitted.  There are no beds available here.  Patient be transferred to [facility] as they do have a bed there.  Discussed with the hospitalist there.  Patient accepted for admission.  There will be some delay of 2 hours or so before but patient can be transferred.  Will hold in the ER until then    LAB  Pertinent labs results reviewed   Results for orders placed or performed during the hospital encounter of 06/15/21   Magnesium   Result Value Ref Range    Magnesium 1.6 (L) 1.8 - 2.6 mg/dL   Comprehensive Metabolic Panel   Result Value Ref Range    Sodium 142 136 - 145 mmol/L     Potassium 3.3 (L) 3.5 - 5.0 mmol/L    Chloride 105 98 - 107 mmol/L    CO2 20 (L) 22 - 31 mmol/L    Anion Gap, Calculation 17 5 - 18 mmol/L    Glucose 103 70 - 125 mg/dL    BUN 7 (L) 8 - 22 mg/dL    Creatinine 0.64 (L) 0.70 - 1.30 mg/dL    GFR MDRD Af Amer >60 >60 mL/min/1.73m2    GFR MDRD Non Af Amer >60 >60 mL/min/1.73m2    Bilirubin, Total 0.8 0.0 - 1.0 mg/dL    Calcium 8.7 8.5 - 10.5 mg/dL    Protein, Total 6.5 6.0 - 8.0 g/dL    Albumin 3.6 3.5 - 5.0 g/dL    Alkaline Phosphatase 120 45 - 120 U/L    AST 49 (H) 0 - 40 U/L    ALT 31 0 - 45 U/L   Alcohol, Ethyl, Blood   Result Value Ref Range    Alcohol, Blood 34 (H) None detected mg/dL   INR   Result Value Ref Range    INR 1.02 0.90 - 1.10   Asymptomatic SARS-CoV-2 (COVID-19)-PCR    Specimen: Respiratory   Result Value Ref Range    SARS-CoV-2 PCR Result Negative Negative, Invalid   HM1 (CBC with Diff)   Result Value Ref Range    WBC 11.6 (H) 4.0 - 11.0 thou/uL    RBC 4.29 (L) 4.40 - 6.20 mill/uL    Hemoglobin 13.8 (L) 14.0 - 18.0 g/dL    Hematocrit 41.0 40.0 - 54.0 %    MCV 96 80 - 100 fL    MCH 32.2 27.0 - 34.0 pg    MCHC 33.7 32.0 - 36.0 g/dL    RDW 15.9 (H) 11.0 - 14.5 %    Platelets 260 140 - 440 thou/uL    MPV 9.7 8.5 - 12.5 fL    Neutrophils % 72 (H) 50 - 70 %    Lymphocytes % 21 20 - 40 %    Monocytes % 6 2 - 10 %    Eosinophils % 0 0 - 6 %    Basophils % 1 0 - 2 %    Immature Granulocyte % 1 (H) <=0 %    Neutrophils Absolute 8.3 (H) 2.0 - 7.7 thou/uL    Lymphocytes Absolute 2.4 0.8 - 4.4 thou/uL    Monocytes Absolute 0.7 0.0 - 0.9 thou/uL    Eosinophils Absolute 0.0 0.0 - 0.4 thou/uL    Basophils Absolute 0.1 0.0 - 0.2 thou/uL    Immature Granulocyte Absolute 0.1 (H) <=0.0 thou/uL   ECG 12 lead nursing unit performed   Result Value Ref Range    SYSTOLIC BLOOD PRESSURE 152 mmHg    DIASTOLIC BLOOD PRESSURE 99 mmHg    VENTRICULAR RATE 126 BPM    ATRIAL RATE 126 BPM    P-R INTERVAL 142 ms    QRS DURATION 84 ms    Q-T INTERVAL 322 ms    QTC CALCULATION (BEZET)  466 ms    P Axis 61 degrees    R AXIS 36 degrees    T AXIS 56 degrees    MUSE DIAGNOSIS       Sinus tachycardia with occasional Premature ventricular complexes  Possible Inferior infarct , age undetermined  Anterior infarct , age undetermined  Abnormal ECG  When compared with ECG of 20-OCT-2020 17:23,  Premature ventricular complexes are now Present  Anterior infarct is now Present  Borderline criteria for Inferior infarct are now Present  Confirmed by SEE ED PROVIDER NOTE FOR, ECG INTERPRETATION (1366),  LORAINE MENESSE ([id number]) on 6/15/2021 1:08:06 AM           RADIOLOGY    Pertinent imaging reviewed   Please see official radiology report.  CT Chest Abdomen Pelvis Without Oral With IV Contrast   Final Result   1.  No solid organ injury.      2.  Superior endplate compression fracture at L1. Please see separate dictation for detailed findings of the spine.      3.  Wall thickening of the lower thoracic esophagus may reflect changes of reflux. Recommend clinical correlation.      4.  Findings consistent with chronic pancreatitis.      CT Lumbar Spine Without Contrast   Final Result   1.  Acute fracture through the superior aspect of L1 with slight vertebral body height loss. Fracture line extends through the posterior aspect of the vertebral body, as well as right pedicle PA and into right transverse process with involvement of    superior articular process.   2.  No significant canal or foraminal narrowing at any level.      CT Head Without Contrast   Final Result   HEAD CT:   1.  Normal head CT.      CERVICAL SPINE CT:   1.  No definite acute fracture.   2.  Degenerative changes, as described in detail above. There is probably moderate canal narrowing at C5-C6, likely due to underlying disc protrusion/extrusion. This is not well-visualized without intrathecal contrast. If indicated, MRI cervical spine    may be performed.      CT Cervical Spine Without Contrast   Final Result   HEAD CT:   1.  Normal head CT.       CERVICAL SPINE CT:   1.  No definite acute fracture.   2.  Degenerative changes, as described in detail above. There is probably moderate canal narrowing at C5-C6, likely due to underlying disc protrusion/extrusion. This is not well-visualized without intrathecal contrast. If indicated, MRI cervical spine    may be performed.          FINAL IMPRESSION    L1 compression fracture  Alcohol abuse     Raimundo Muhammad M.D.  Emergency Medicine  [facility] EMERGENCY ROOM  [facility] [id number]  Dept: [phone]  Loc: [phone]     Raimundo Muhammad MD  06/15/21 0424

## 2021-06-28 NOTE — TELEPHONE ENCOUNTER
[facility] Nurse Triage Telephone Note    Provider: JOSÉ LUIS Tapia CNP  Facility: [facility]  Facility Type:  TCU    Caller: PM nurse  Call Back Number: [phone]    Allergies:  No Known Allergies     Reason for call: Nurse reporting that Mg level for today was missed.      Verbal Order/Direction given by Provider: Check Mg level on 6/30/21.      Provider giving Order:  JOSÉ LUIS Tapia CNP    Verbal Order given to: PM nurse    Modesto Mariano RN

## 2021-06-30 NOTE — LETTER
6/30/2021        RE: Nacho Downey  [address] [facility] [address]        [facility] GERIATRICS  INITIAL VISIT NOTE  June 30, 2021    PRIMARY CARE PROVIDER AND CLINIC:  Samuel, Gage Morrow [address] / [facility] [address]    Chief Complaint   Patient presents with     Hospital F/U       HPI:    Nacho Downey is a 35 year old  (1985) male who was seen at [facility] on June 30, 2021 for an initial visit. Medical history is notable for alcohol abuse, anxiety, frostbite of both feet s/p bilateral TMA (Jan 2020) with complicated post op course involving osteomyelitis. He was hospitalized at [facility] from 6/15/21 to 6/17/21 where he presented with falls in setting of EtOH intoxication and was found to have an acute L1 compression fracture. Concern by neurosurgery for unstable fracture; however, he declined surgery and instead was fitted for a TLSO. He was discharged to a TCU but left there after an hour and went home. There, he developed increasing pain and was again hospitalized at [facility] from 6/18/21 to 6/23/21 where he was admitted for pain control and disposition. He med with CD counselor with plan for TCU directly to inpatient treatment at [place]. He was admitted to this facility for medical management and rehab.     Today, Mr. Downey is seen in his room. He has an appointment with [facility] later this morning. Main concern is R shoulder pain that pre-dated his back injury and even his foot surgery. It's making it difficult to work with PT. He used the difficulty in lifting a gallon of milk as an example. I encouraged him to ask about it at his appointment today given his frequent falls, it could be related cervical spine vs rotator cuff.     I talked with him about my concern for a med error with the gabapentin dose. I was able to look at the card from pharmacy with his RN and the card/dose is correct. It's a transcription error into the facility medical record -  he's been getting the correct 600 mg TID. When I went back to talk with him about it being OK, he wasn't in his room.     CODE STATUS:   CPR/Full code     ALLERGIES:   No Known Allergies    PAST MEDICAL HISTORY:   EtOH abuse, anxiety, s/p bilateral TMA due to frostbite     PAST SURGICAL HISTORY:   No past surgical history on file.    FAMILY HISTORY:   Reviewed and non-contributory. Ovarian cancer in his mother.     SOCIAL HISTORY:   Lives with his dad most recently.     MEDICATIONS:  Post Discharge Medication Reconciliation Status: discharge medications reconciled and changed, per note/orders.   Current Outpatient Medications   Medication Sig Dispense Refill     acetaminophen (TYLENOL) 325 MG tablet Take 650 mg by mouth every 4 hours as needed for mild pain       folic acid (FOLVITE) 1 MG tablet Take 1 mg by mouth daily       gabapentin (NEURONTIN) 600 MG tablet Take 1,200 mg by mouth 3 times daily       Lidocaine (LIDOCARE) 4 % Patch Place 1 patch onto the skin every 24 hours To prevent lidocaine toxicity, patient should be patch free for 12 hrs daily.       melatonin 3 MG tablet Take 6 mg by mouth At Bedtime       methocarbamol (ROBAXIN) 500 MG tablet Take 500 mg by mouth every 6 hours       nicotine (NICORETTE) 2 MG gum Place 2 mg inside cheek every 2 hours as needed for smoking cessation       oxyCODONE (ROXICODONE) 5 MG tablet Take 5 mg by mouth every 4 hours as needed for moderate pain       vitamin D2 (ERGOCALCIFEROL) 89522 units (1250 mcg) capsule Take 50,000 Units by mouth once a week Every Thursday         ROS:  4 point ROS neg other than the symptoms noted above in the HPI.    PHYSICAL EXAM:  /75   Pulse 90   Temp 96.9  F (36.1  C)   Resp 18   Ht 1.829 m (6')   Wt 84 kg (185 lb 1.6 oz)   SpO2 97%   BMI 25.10 kg/m     Gen: sitting on edge of the bed, alert, cooperative and in no acute distress  HEENT: normocephalic; moist mucous membranes; no scleral icterus   Resp: lungs clear to auscultation  bilaterally, no crackles or wheezes  MSK: normal muscle tone, mild dependent LE edema  Neuro: CX II-XII grossly in tact; ROM in all four extremities grossly in tact  Psych: alert and oriented x3; normal affect  Skin: no jaundice, no obvious rash or concerning lesions    LABORATORY/IMAGING DATA:  Reviewed as per Epic    ASSESSMENT/PLAN:    Acute L1 Compression Fracture  Secondary to a fall  -- TLSO when out of bed  -- analgesia with APAP 650 mg q4h PRN, lidoderm patch on q12h/off q12h, methocarbamol 500 mg q6h and oxycodone 5 mg q4h PRN   -- PT/OT  -- follow up with [facility] as scheduled     Left Foot Osteomyelitis  Frostbite s/p Bilateral TMAs (Jan 2021)  Has been ambulating at WC level as of late.   -- see HPI re: gabapentin dose -- is correct at 600 mg TID    Alcohol Abuse  Plan is for discharge from TCU to inpatient treatment at [place].   -- encourage and support sobriety   -- folic acid 1 mg daily  -- could consider adding thiamine 100 mg daily    Tobacco Dependence   He is going out to smoke  -- nicorette gum PRN  -- encourage and support cessation as able     Insomnia  -- melatonin 6 mg     Recurrent Falls  Physical Deconditioning  In setting of hospitalization and underlying medical conditions  -- ongoing PT/OT    Electronically signed by:  Yessi Villanueva MD                          Sincerely,        Yessi Villanueva MD

## 2021-06-30 NOTE — PROGRESS NOTES
[facility] GERIATRICS  INITIAL VISIT NOTE  June 30, 2021    PRIMARY CARE PROVIDER AND CLINIC:  Clinic, [facility] [address] / [facility] [id number]    Chief Complaint   Patient presents with     Hospital F/U       HPI:    Nacho Downey is a 35 year old  (1985) male [facility]. Medical history is notable for alcohol abuse, anxiety, frostbite of both feet s/p bilateral TMA (Jan 2020) with complicated post op course involving osteomyelitis. He was hospitalized at [facility] from 6/15/21 to 6/17/21 where he presented with falls in setting of EtOH intoxication and was found to have an acute L1 compression fracture. Concern by neurosurgery for unstable fracture; however, he declined surgery and instead was fitted for a TLSO. He was discharged to a TCU but left there after an hour and went home. There, he developed increasing pain and was again hospitalized at [facility] from 6/18/21 to 6/23/21 where he was admitted for pain control and disposition. He med with CD counselor with plan for TCU directly to inpatient treatment at [place]. He was admitted to this facility for medical management and rehab.     Today, Mr. Downey is seen in his room. He has an appointment with [facility] later this morning. Main concern is R shoulder pain that pre-dated his back injury and even his foot surgery. It's making it difficult to work with PT. He used the difficulty in lifting a gallon of milk as an example. I encouraged him to ask about it at his appointment today given his frequent falls, it could be related cervical spine vs rotator cuff.     I talked with him about my concern for a med error with the gabapentin dose. I was able to look at the card from pharmacy with his RN and the card/dose is correct. It's a transcription error into the facility medical record - he's been getting the correct 600 mg TID. When I went back to talk with him about it being  OK, he wasn't in his room.     CODE STATUS:   CPR/Full code     ALLERGIES:   No Known Allergies    PAST MEDICAL HISTORY:   EtOH abuse, anxiety, s/p bilateral TMA due to frostbite     PAST SURGICAL HISTORY:   No past surgical history on file.    FAMILY HISTORY:   Reviewed and non-contributory. Ovarian cancer in his mother.     SOCIAL HISTORY:   Lives with his dad most recently.     MEDICATIONS:  Post Discharge Medication Reconciliation Status: discharge medications reconciled and changed, per note/orders.   Current Outpatient Medications   Medication Sig Dispense Refill     acetaminophen (TYLENOL) 325 MG tablet Take 650 mg by mouth every 4 hours as needed for mild pain       folic acid (FOLVITE) 1 MG tablet Take 1 mg by mouth daily       gabapentin (NEURONTIN) 600 MG tablet Take 1,200 mg by mouth 3 times daily       Lidocaine (LIDOCARE) 4 % Patch Place 1 patch onto the skin every 24 hours To prevent lidocaine toxicity, patient should be patch free for 12 hrs daily.       melatonin 3 MG tablet Take 6 mg by mouth At Bedtime       methocarbamol (ROBAXIN) 500 MG tablet Take 500 mg by mouth every 6 hours       nicotine (NICORETTE) 2 MG gum Place 2 mg inside cheek every 2 hours as needed for smoking cessation       oxyCODONE (ROXICODONE) 5 MG tablet Take 5 mg by mouth every 4 hours as needed for moderate pain       vitamin D2 (ERGOCALCIFEROL) 23645 units (1250 mcg) capsule Take 50,000 Units by mouth once a week Every Thursday         ROS:  4 point ROS neg other than the symptoms noted above in the HPI.    PHYSICAL EXAM:  /75   Pulse 90   Temp 96.9  F (36.1  C)   Resp 18   Ht 1.829 m (6')   Wt 84 kg (185 lb 1.6 oz)   SpO2 97%   BMI 25.10 kg/m     Gen: sitting on edge of the bed, alert, cooperative and in no acute distress  HEENT: normocephalic; moist mucous membranes; no scleral icterus   Resp: lungs clear to auscultation bilaterally, no crackles or wheezes  MSK: normal muscle tone, mild dependent LE  edema  Neuro: CX II-XII grossly in tact; ROM in all four extremities grossly in tact  Psych: alert and oriented x3; normal affect  Skin: no jaundice, no obvious rash or concerning lesions    LABORATORY/IMAGING DATA:  Reviewed as per Epic    ASSESSMENT/PLAN:    Acute L1 Compression Fracture  Secondary to a fall  -- TLSO when out of bed  -- analgesia with APAP 650 mg q4h PRN, lidoderm patch on q12h/off q12h, methocarbamol 500 mg q6h and oxycodone 5 mg q4h PRN   -- PT/OT  -- follow up with [facility] as scheduled     Left Foot Osteomyelitis  Frostbite s/p Bilateral TMAs (Jan 2021)  Has been ambulating at  level as of late.   -- see HPI re: gabapentin dose -- is correct at 600 mg TID    Alcohol Abuse  Plan is for discharge from TCU to inpatient treatment at [place].   -- encourage and support sobriety   -- folic acid 1 mg daily  -- could consider adding thiamine 100 mg daily    Tobacco Dependence   He is going out to smoke  -- nicorette gum PRN  -- encourage and support cessation as able     Insomnia  -- melatonin 6 mg     Recurrent Falls  Physical Deconditioning  In setting of hospitalization and underlying medical conditions  -- ongoing PT/OT    Electronically signed by:  Yessi Villanueva MD

## 2021-07-02 NOTE — PROGRESS NOTES
"[facility] GERIATRIC SERVICES DISCHARGE SUMMARY  Patient Name: Nacho Downey  YOB: 1985  [place] Medical Record Number: [id number]  Place of Service Where Encounter Took Place: [facility] (U) [[id number]]    PRIMARY CARE PROVIDER AND CLINIC RESPONSIBLE AFTER TRANSFER: [facility], [address] / [facility] [address]; Phone: [phone]; Fax: [phone]; Non-G Provider     Transferring providers: JOSÉ LUIS Tapia CNP; Yessi Villanueva MD  Recent Hospitalization/ED: [facility]  stay 6/18/21 to 6/23/21.  Date of SNF Admission: 6/23/2021  Date of SNF (anticipated) Discharge: July 07, 2021  Discharged to: previous independent home  Cognitive Scores: BIMS: 15/15  Physical Function: Ambulating 150 ft with walker, crutches, Ind  DME: crutches, TLSO    CODE STATUS/ADVANCE DIRECTIVES DISCUSSION: Full Code   ALLERGIES: Patient has no known allergies.    NURSING FACILITY COURSE   Medication Changes/Rationale:     Increased Melatonin from 3mg to 6 mg due to insomnia    Summary of nursing facility stay:   Nacho Downey is a 35 year old male with PMH of ETOH abuse, s/p bilateral transmetatarsal amputation (1/2021) complicated by osteomyelitis of the left foot (3/2021), polyneuropathy who presented with back pain after a fall while intoxicated. Had been previously diagnosed with L1 compression fracture on 6/15/21, and he was discharged to TCU on 6/17, but left after one hour due to \"terrible\" conditions. As he left with no pain medication returned to ED on 6/18 with worsening pain and difficulty walking. Was evaluated by Neurosurgery, felt fracture was actually an L1 chance fracture and recommended TLSO when OOB, follow-up in 2 weeks. New TCU placement was found and when medically stable he was discharged to TCU for further rehab and medical management    Compression fracture of L1 vertebra with routine healing, subsequent encounter  Physical " "deconditioning  Recurrent falls  L1 chance fracture. neurosurgery recommended non operative management. Secondary to fall while intoxicated. Has made progress with PT and OT in TCU. Is able to manage TLSO brace by self. Pain is controlled. Will discharge on APAP 650mg q4h PRN, oxycodone 5mg q4h PRN, methocarbamol 500mg q6h PRN, lidocaine 4% to low back daily. TLSO when OOB. Follow-up with neurosurgery per their recommendation. Will continue with home PT and OT. Discussed dangers of mixing narcotics with alcohol with patient.     Alcohol abuse  Required ativan for withdrawal during 6/15 hospitalization, has been sober since then. Previously would binge drink up to 1.75L of vodka daily.  is assisting patient with finding \"intensive\" outpatient substance abuse treatment.     Tobacco dependence syndrome  Has desire to quit, but did go out to some on occasion in TCU.   - nicotine polacrilex (NICORETTE) 4 MG gum; Place 1 each (4 mg) inside cheek as needed for smoking cessation    Insomnia due to medical condition  Melatonin increased from 3mg to 6mg in TCU with some improvement.     Vitamin D deficiency  Critically low at 3.34, has been on vitamin D 50,000 units x8 weeks, then will need Vitamin D level recheck with PCP to determine further treatment.     Polyneuropathy  H/o of bilat transmetatarsal amputation of feet due to frost bite. Continues on gabapentin 600mg TID.         Discharge Medications:  Current Outpatient Medications   Medication Sig Dispense Refill     nicotine polacrilex (NICORETTE) 4 MG gum Place 1 each (4 mg) inside cheek as needed for smoking cessation 100 each 0     oxyCODONE (ROXICODONE) 5 MG tablet Take 1 tablet (5 mg) by mouth every 4 hours as needed for moderate pain 30 tablet 0     acetaminophen (TYLENOL) 325 MG tablet Take 650 mg by mouth every 4 hours as needed for mild pain       folic acid (FOLVITE) 1 MG tablet Take 1 mg by mouth daily       gabapentin (NEURONTIN) 300 MG " capsule Take 600 mg by mouth 3 times daily        Lidocaine (LIDOCARE) 4 % Patch Place 1 patch onto the skin every 24 hours To prevent lidocaine toxicity, patient should be patch free for 12 hrs daily.       melatonin 3 MG tablet Take 6 mg by mouth At Bedtime       methocarbamol (ROBAXIN) 500 MG tablet Take 500 mg by mouth every 6 hours       vitamin D2 (ERGOCALCIFEROL) 63537 units (1250 mcg) capsule Take 50,000 Units by mouth once a week Every Thursday       Controlled medications:   Medication oxycodone electronically prescribed to [facility] pharmacy  Medication: oxycodone , 10 tabs given to patient at the time of discharge to take home     Past Medical History: No past medical history on file.  Physical Exam:   Vitals: BP (!) 147/93   Pulse 90   Temp 97.6  F (36.4  C)   Resp 18   Ht 1.829 m (6')   Wt 86.1 kg (189 lb 14.4 oz)   SpO2 98%   BMI 25.76 kg/m    BMI= Body mass index is 25.76 kg/m .  GENERAL APPEARANCE:  Alert, in no distress, cooperative  RESP:  lungs clear to auscultation , no respiratory distress  CV:  regular rate and rhythm, no murmur, rub, or gallop, peripheral edema 1-2+ in BLE  ABDOMEN:  bowel sounds normal, soft, non-tender  M/S:   TLSO when OOB, decreased ROM to lumbar spine  SKIN:  no obvious wounds or lesions  NEURO:   Cn 2-12 grossly intac  PSYCH:  oriented X 3, normal insight, judgement and memory, affect and mood normal     SNF labs: Labs done in SNF [facility]. Please refer to them using [facility]/Care Everywhere. and Recent labs in [facility] reviewed by me today.     DISCHARGE PLAN:    Follow up labs: Needs follow-up vitamin D level in ~6 weeks    Medical Follow Up:     Follow up with primary care provider in 1-2 weeks    Follow up with specialist Neurosurgery per their recommendation   Current [place] scheduled appointments: None.    Discharge Services: Home Care:  Occupational Therapy and Physical Therapy    Discharge Instructions Verbalized to Patient at Discharge:     DO  NOT DRIVE while taking narcotic pain medications.     TLSO when OOB    TOTAL DISCHARGE TIME: Greater than 30 minutes  Electronically signed by:  JOSÉ LUIS Gaspar CNP     Documentation of Face to Face and Certification for Home Health Services    I certify that patient: Nacho Downey is under my care and that I, or a nurse practitioner or physician's assistant working with me, had a face-to-face encounter that meets the physician face-to-face encounter requirements with this patient on: 7/5/2021.    This encounter with the patient was in whole, or in part, for the following medical condition, which is the primary reason for home health care: recurrent falls, L1 compression fracture, bilat transmetatarsal amputation, physical deconditioning.    I certify that, based on my findings, the following services are medically necessary home health services: Occupational Therapy and Physical Therapy.    My clinical findings support the need for the above services because: Occupational Therapy Services are needed to assess and treat cognitive ability and address ADL safety due to impairment in mobility needs ADL training, needs to use adaptive equipment. and Physical Therapy Services are needed to assess and treat the following functional impairments: mobility, recurrent falls, L1 compression fracture, needs continued rehab.    Further, I certify that my clinical findings support that this patient is homebound (i.e. absences from home require considerable and taxing effort and are for medical reasons or [employer] services or infrequently or of short duration when for other reasons) because: Requires assistance of another person or specialized equipment to access medical services because patient: Range of motion limitations prevents ability to exit home safely...    Based on the above findings. I certify that this patient is confined to the home and needs intermittent skilled nursing care, physical therapy and/or speech  therapy.  The patient is under my care, and I have initiated the establishment of the plan of care.  This patient will be followed by a physician who will periodically review the plan of care.  Physician/Provider to provide follow up care: Gage Baker    [facility] physician (the Medicare certified [facility] provider): JOSÉ LUIS Gaspar CNP  Physician Signature: See electronic signature associated with these discharge orders.    Dr.Sara Kay MD, signing F2F and only signing for initial order. Please send all follow up questions and concerns or needed follow up signatures to the PCP, who Ralf has on file as:  Gage Baker.    Date: 7/5/2021

## 2021-07-05 NOTE — LETTER
"    7/5/2021        RE: Nacho Downey  [address] [facility] [address]        [facility] DISCHAR[facility]  Patient Name: Nacho Downey  YOB: 1985  [place] Medical Record Number: [id number]  Place of Service Where Encounter Took Place: [facility] ([facility]) [[id number]]    PRIMARY CARE PROVIDER AND CLINIC RESPONSIBLE AFTER TRANSFER: [facility], [address] / [facility] [address]; Phone: [phone]; Fax: [phone]; Non-G Provider     Transferring providers: JOSÉ LUIS Tapia CNP; Yessi Villanueva MD  Recent Hospitalization/ED: [facility]  stay 6/18/21 to 6/23/21.  Date of SNF Admission: 6/23/2021  Date of SNF (anticipated) Discharge: July 07, 2021  Discharged to: previous independent home  Cognitive Scores: BIMS: 15/15  Physical Function: Ambulating 150 ft with walker, crutches, Ind  DME: crutches, TLSO    CODE STATUS/ADVANCE DIRECTIVES DISCUSSION: Full Code   ALLERGIES: Patient has no known allergies.    NURSING FACILITY COURSE   Medication Changes/Rationale:     Increased Melatonin from 3mg to 6 mg due to insomnia    Summary of nursing facility stay:   Nacho Downey is a 35 year old male with PMH of ETOH abuse, s/p bilateral transmetatarsal amputation (1/2021) complicated by osteomyelitis of the left foot (3/2021), polyneuropathy who presented with back pain after a fall while intoxicated. Had been previously diagnosed with L1 compression fracture on 6/15/21, and he was discharged to TCU on 6/17, but left after one hour due to \"terrible\" conditions. As he left with no pain medication returned to ED on 6/18 with worsening pain and difficulty walking. Was evaluated by Neurosurgery, felt fracture was actually an L1 chance fracture and recommended TLSO when OOB, follow-up in 2 weeks. New TCU placement was found and when medically stable he was discharged to TCU for further rehab and medical " "management    Compression fracture of L1 vertebra with routine healing, subsequent encounter  Physical deconditioning  Recurrent falls  L1 chance fracture. neurosurgery recommended non operative management. Secondary to fall while intoxicated. Has made progress with PT and OT in TCU. Is able to manage TLSO brace by self. Pain is controlled. Will discharge on APAP 650mg q4h PRN, oxycodone 5mg q4h PRN, methocarbamol 500mg q6h PRN, lidocaine 4% to low back daily. TLSO when OOB. Follow-up with neurosurgery per their recommendation. Will continue with home PT and OT. Discussed dangers of mixing narcotics with alcohol with patient.     Alcohol abuse  Required ativan for withdrawal during 6/15 hospitalization, has been sober since then. Previously would binge drink up to 1.75L of vodka daily.  is assisting patient with finding \"intensive\" outpatient substance abuse treatment.     Tobacco dependence syndrome  Has desire to quit, but did go out to some on occasion in TCU.   - nicotine polacrilex (NICORETTE) 4 MG gum; Place 1 each (4 mg) inside cheek as needed for smoking cessation    Insomnia due to medical condition  Melatonin increased from 3mg to 6mg in TCU with some improvement.     Vitamin D deficiency  Critically low at 3.34, has been on vitamin D 50,000 units x8 weeks, then will need Vitamin D level recheck with PCP to determine further treatment.     Polyneuropathy  H/o of bilat transmetatarsal amputation of feet due to frost bite. Continues on gabapentin 600mg TID.         Discharge Medications:  Current Outpatient Medications   Medication Sig Dispense Refill     nicotine polacrilex (NICORETTE) 4 MG gum Place 1 each (4 mg) inside cheek as needed for smoking cessation 100 each 0     oxyCODONE (ROXICODONE) 5 MG tablet Take 1 tablet (5 mg) by mouth every 4 hours as needed for moderate pain 30 tablet 0     acetaminophen (TYLENOL) 325 MG tablet Take 650 mg by mouth every 4 hours as needed for mild pain   "     folic acid (FOLVITE) 1 MG tablet Take 1 mg by mouth daily       gabapentin (NEURONTIN) 300 MG capsule Take 600 mg by mouth 3 times daily        Lidocaine (LIDOCARE) 4 % Patch Place 1 patch onto the skin every 24 hours To prevent lidocaine toxicity, patient should be patch free for 12 hrs daily.       melatonin 3 MG tablet Take 6 mg by mouth At Bedtime       methocarbamol (ROBAXIN) 500 MG tablet Take 500 mg by mouth every 6 hours       vitamin D2 (ERGOCALCIFEROL) 17961 units (1250 mcg) capsule Take 50,000 Units by mouth once a week Every Thursday       Controlled medications:   Medication oxycodone electronically prescribed to [facility] pharmacy  Medication: oxycodone , 10 tabs given to patient at the time of discharge to take home     Past Medical History: No past medical history on file.  Physical Exam:   Vitals: BP (!) 147/93   Pulse 90   Temp 97.6  F (36.4  C)   Resp 18   Ht 1.829 m (6')   Wt 86.1 kg (189 lb 14.4 oz)   SpO2 98%   BMI 25.76 kg/m    BMI= Body mass index is 25.76 kg/m .  GENERAL APPEARANCE:  Alert, in no distress, cooperative  RESP:  lungs clear to auscultation , no respiratory distress  CV:  regular rate and rhythm, no murmur, rub, or gallop, peripheral edema 1-2+ in BLE  ABDOMEN:  bowel sounds normal, soft, non-tender  M/S:   TLSO when OOB, decreased ROM to lumbar spine  SKIN:  no obvious wounds or lesions  NEURO:   Cn 2-12 grossly intac  PSYCH:  oriented X 3, normal insight, judgement and memory, affect and mood normal     SNF labs: Labs done in SNF are in [place] EPIC. Please refer to them using [facility] Everywhere. and Recent labs in [facility] reviewed by me today.     DISCHARGE PLAN:    Follow up labs: Needs follow-up vitamin D level in ~6 weeks    Medical Follow Up:     Follow up with primary care provider in 1-2 weeks    Follow up with specialist Neurosurgery per their recommendation   Current [place] scheduled appointments: None.    Discharge Services: Home Care:  Occupational  Therapy and Physical Therapy    Discharge Instructions Verbalized to Patient at Discharge:     DO NOT DRIVE while taking narcotic pain medications.     TLSO when OOB    TOTAL DISCHARGE TIME: Greater than 30 minutes  Electronically signed by:  JOSÉ LUIS Gaspar CNP     Documentation of Face to Face and Certification for Home Health Services    I certify that patient: Nacho Downey is under my care and that I, or a nurse practitioner or physician's assistant working with me, had a face-to-face encounter that meets the physician face-to-face encounter requirements with this patient on: 7/5/2021.    This encounter with the patient was in whole, or in part, for the following medical condition, which is the primary reason for home health care: recurrent falls, L1 compression fracture, bilat transmetatarsal amputation, physical deconditioning.    I certify that, based on my findings, the following services are medically necessary home health services: Occupational Therapy and Physical Therapy.    My clinical findings support the need for the above services because: Occupational Therapy Services are needed to assess and treat cognitive ability and address ADL safety due to impairment in mobility needs ADL training, needs to use adaptive equipment. and Physical Therapy Services are needed to assess and treat the following functional impairments: mobility, recurrent falls, L1 compression fracture, needs continued rehab.    Further, I certify that my clinical findings support that this patient is homebound (i.e. absences from home require considerable and taxing effort and are for medical reasons or Hindu services or infrequently or of short duration when for other reasons) because: Requires assistance of another person or specialized equipment to access medical services because patient: Range of motion limitations prevents ability to exit home safely...    Based on the above findings. I certify that this patient  is confined to the home and needs intermittent skilled nursing care, physical therapy and/or speech therapy.  The patient is under my care, and I have initiated the establishment of the plan of care.  This patient will be followed by a physician who will periodically review the plan of care.  Physician/Provider to provide follow up care: [facility] Baker    [facility] physician (the Medicare certified PECOS provider): JOSÉ LUIS Gaspar CNP  Physician Signature: See electronic signature associated with these discharge orders.    Dr.Sara Kay MD, signing F2F and only signing for initial order. Please send all follow up questions and concerns or needed follow up signatures to the PCP, who [facility] has on file as:  [facility] Baker.    Date: 7/5/2021        Sincerely,        JOSÉ LUIS Gaspar CNP

## 2023-05-19 NOTE — LETTER
Letter by Felicitas Zhao NP at      Author: Felicitas Zhao NP Service: -- Author Type: --    Filed:  Encounter Date: 2021 Status: (Other)         [address] [address]                                  2021    Patient: Nacho Downey   MR Number: [id number]   YOB: 1985   Date of Visit: 2021     Dear Dr. Jarvis:    Thank you for referring Nacho Downey to me for evaluation. Below are the relevant portions of my assessment and plan of care.    If you have questions, please do not hesitate to call me. I look forward to following Nacho along with you.    Sincerely,        Felicitas Zhao NP          CC  No Recipients  Felicitas Zhao NP  2021  7:21 PM  Signed              [facility]    DATE: 2021    NAME:  Nacho Downey             :  1985  MRN: [id number]  CODE STATUS:  FULL CODE    VISIT TYPE: Discharge Summary     FACILITY:  [facility] [[id number]]       CHIEF COMPLAIN/REASON FOR VISIT:    Chief Complaint   Patient presents with   ? Discharge Summary               HISTORY OF PRESENT ILLNESS: Nacho Downey is a 35 y.o. male who was admitted to [facility] for bilateral transmetatarsal amputation, gastroc tendon lengthening bone biopsy, and flap closure for frostbite, gangrene, and osteomyelitis. Hospital course was unremarkable. He was also noted to have left proximal phalanx base fracture and plastic surgery was consulted. They recommended outpatient follow up and surgery. This was suspected to be an injury related to a fall on [place] years. He had worsening anxiety during hospital course. He was recommended trazodone and hydroxyzine and to follow up in few weeks for further med management. He did not have any signs of alcohol withdrawal during hospital course. He was started on habitrol patches for nicotine replacement. He was recommended TCU transfer for further rehab. He has  PMH of anxiety, polyneuropathy, ETOH abuse and withdrawal with seizures, tobacco abuse. Prior to this he lived at home.     TCU course:   Mr. Downey has made progress with therapy and is felt to be appropriate for discharge home per insurance at wheelchair level. He is independent at wheelchair level. During his short tcu course his pain was well controlled and he was encouraged to wean down on oxycodone. He did have some muscle cramping and was started on flexeril. His vitals and labs were stable. He will be discharging home on 2/17 with  PT, OT, RN for wound cares. He will be sent with small amount of remaining oxycodone and will wean off at home. He was instructed to contact ortho if needing more. He does have postop follow up on 2/15 with [facility]. He was encouraged to schedule f/u with plastic surgeon in clinic in 1-2 weeks. He did not have any signs of alcohol withdrawal during tcu course. He continues on nicotine patches for smoking cessation. Wheelchair, walker, and shower bench ordered for him. He was recommended to follow-up with PCP in 5-7 days.     DME Order  Wheelchair    Wheelchair Face to Face documentation  Length of need: 99 (lifetime)  Diagnosis:   1. History of transmetatarsal amputation of foot (H)     2. Polyneuropathy     3. Closed displaced fracture of proximal phalanx of left little finger with routine healing, subsequent encounter     4. Tobacco abuse     5. Anxiety     6. Frostbite of both feet, subsequent encounter     7. Alcohol abuse          Face to face completed with Nacho Downey in regards to using a wheelchair.  Patient requires a wheelchair to use within the home due to the following limitations in mobility: amputation of all toes on Bilateral feet, Heel weight bearing on Bilateral feet,  poor standing balance, weakness, fatigue, and abnormalities of gait.  Patient has impairment in ability to complete mobility related ADLs from standing.  Patient would not be able to safely  complete mobility and mobility related ADLs with use of a cane, walker or crutches.  Patient has expressed willingness to propel a wheelchair.  Patient has physical ability to propel wheelchair.  patients home can accommodate use of the wheelchair for mobility.     Please contact with questions [phone], Fax: [phone].    Signature  Felicitas Zhao NP  NPI [id number]        REVIEW OF SYSTEMS:  PROBLEMS AND REVIEW OF SYSTEMS:   Today on ROS:   Currently, no fever, chills, or rigors. Does not have any visual or hearing problems. Denies any chest pain, headaches, palpitations, lightheadedness, dizziness, shortness of breath, or cough. Appetite is good. Denies any GERD symptoms. Denies any difficulty with swallowing, nausea, or vomiting.  Denies any abdominal pain, diarrhea or constipation. Denies any urinary symptoms. No insomnia. No active bleeding. No rash. Positive for weakness, bilateral foot wounds covered, minimal leg swelling, pain controlled, muscle cramping, numb/tingling ble      Allergies   Allergen Reactions   ? Other Drug Allergy (See Comments)      Hx of prolong QT on EKG. Avoid QT prolonging meds or get EKG prior     Current Outpatient Medications   Medication Sig   ? acetaminophen (TYLENOL) 500 MG tablet Take 1,000 mg by mouth every 6 (six) hours as needed for pain.    ? cyclobenzaprine (FLEXERIL) 5 MG tablet Take 5 mg by mouth every 6 (six) hours as needed for muscle spasms. And at bedtime scheduled   ? gabapentin (NEURONTIN) 300 MG capsule Take 600 mg by mouth 3 (three) times a day.   ? hydrOXYzine pamoate (VISTARIL) 25 MG capsule Take 25-50 mg by mouth every 4 (four) hours as needed for anxiety.   ? melatonin 3 mg Tab tablet Take 6 mg by mouth at bedtime.   ? multivitamin with minerals tablet Take 1 tablet by mouth daily.   ? nicotine (NICODERM CQ) 14 mg/24 hr Place 1 patch on the skin daily.   ? oxyCODONE (ROXICODONE) 5 MG immediate release tablet Take 5 mg by mouth every 4 (four) hours as  needed for pain.   ? rivaroxaban ANTICOAGULANT (XARELTO) 10 mg tablet Take 10 mg by mouth daily.   ? traZODone (DESYREL) 50 MG tablet Take 25 mg by mouth at bedtime.     Past Medical History:    Past Medical History:   Diagnosis Date   ? Acute osteomyelitis of right foot (H)    ? Alcohol abuse    ? Anxiety    ? Broken collarbone    ? Corneal abrasion    ? Gangrene (H)    ? Mandible fracture (H)    ? Open wound of left foot    ? Open wound of right foot    ? Polyneuropathy    ? Wound infection after surgery      Social History     Socioeconomic History   ? Marital status: Single     Spouse name: Not on file   ? Number of children: Not on file   ? Years of education: Not on file   ? Highest education level: Not on file   Occupational History   ? Not on file   Social Needs   ? Financial resource strain: Not on file   ? Food insecurity     Worry: Not on file     Inability: Not on file   ? Transportation needs     Medical: Not on file     Non-medical: Not on file   Tobacco Use   ? Smoking status: Current Some Day Smoker     Packs/day: 0.50     Types: Cigarettes   ? Smokeless tobacco: Never Used   Substance and Sexual Activity   ? Alcohol use: Yes     Alcohol/week: 12.0 standard drinks     Types: 12 Standard drinks or equivalent per week   ? Drug use: Not on file   ? Sexual activity: Not on file   Lifestyle   ? Physical activity     Days per week: Not on file     Minutes per session: Not on file   ? Stress: Not on file   Relationships   ? Social connections     Talks on phone: Not on file     Gets together: Not on file     Attends Zoroastrianism service: Not on file     Active member of club or organization: Not on file     Attends meetings of clubs or organizations: Not on file     Relationship status: Not on file   ? Intimate partner violence     Fear of current or ex partner: Not on file     Emotionally abused: Not on file     Physically abused: Not on file     Forced sexual activity: Not on file   Other Topics Concern   ?  "Incontinent No   ? Toileting independently No   ? Cognitive impairment No   ? Vision impairment No   ? Hearing impairment No   ? Dentures No   Social History Narrative   ? Not on file     Family history: see chart      PHYSICAL EXAMINATION  Vitals:    02/12/21 0920   BP: 112/68   Pulse: 80   Resp: 16   Temp: 98.7  F (37.1  C)   SpO2: 95%   Weight: 196 lb 3.2 oz (89 kg)   Height: 6' 4\" (1.93 m)       Today on physical exam:     GENERAL: Awake, Alert, oriented x3, not in any form of acute distress, answers questions appropriately, follows simple commands, conversant, weakness  HEENT: Head is normocephalic with normal hair distribution. No evidence of trauma. Ears: No acute purulent discharge. Eyes: Conjunctivae pink with no scleral jaundice. Nose: Normal mucosa and septum. NECK: Supple with no cervical or supraclavicular lymphadenopathy. Trachea is midline.   CHEST: No tenderness or deformity, no crepitus  LUNG: dim to auscultation with good chest expansion. There are no crackles or wheezes, normal AP diameter. No shortness of breath or cough  BACK: No kyphosis of the thoracic spine. Symmetric, no curvature, ROM normal, no CVA tenderness, no spinal tenderness   CVS: There is good S1  S2, regular rhythm, there are no murmurs, rubs, gallops, or heaves,  2+ pulses symmetric in all extremities.  ABDOMEN: Rounded and soft, nontender to palpation, non distended, no masses, no organomegaly, good bowel sounds, no rebound or guarding, no peritoneal signs.   EXTREMITIES: trace ble pedal edema, numb/tingling ble. Bilateral foot wounds covered  SKIN: Warm and dry, no erythema noted.  Skin color, texture, no rashes or lesions.  NEUROLOGICAL: The patient is oriented to person, place and time.             LABS:   Recent Results (from the past 168 hour(s))   Basic Metabolic Panel   Result Value Ref Range    Sodium 136 136 - 145 mmol/L    Potassium 4.6 3.5 - 5.0 mmol/L    Chloride 105 98 - 107 mmol/L    CO2 25 22 - 31 mmol/L    Anion " Anemia Gap, Calculation 6 5 - 18 mmol/L    Glucose 102 70 - 125 mg/dL    Calcium 8.9 8.5 - 10.5 mg/dL    BUN 9 8 - 22 mg/dL    Creatinine 0.66 (L) 0.70 - 1.30 mg/dL    GFR MDRD Af Amer >60 >60 mL/min/1.73m2    GFR MDRD Non Af Amer >60 >60 mL/min/1.73m2   HM1 (CBC with Diff)   Result Value Ref Range    WBC 15.1 (H) 4.0 - 11.0 thou/uL    RBC 3.29 (L) 4.40 - 6.20 mill/uL    Hemoglobin 10.0 (L) 14.0 - 18.0 g/dL    Hematocrit 32.1 (L) 40.0 - 54.0 %    MCV 98 80 - 100 fL    MCH 30.4 27.0 - 34.0 pg    MCHC 31.2 (L) 32.0 - 36.0 g/dL    RDW 14.3 11.0 - 14.5 %    Platelets 546 (H) 140 - 440 thou/uL    MPV 9.6 8.5 - 12.5 fL    Neutrophils % 59 50 - 70 %    Lymphocytes % 22 20 - 40 %    Monocytes % 12 (H) 2 - 10 %    Eosinophils % 2 0 - 6 %    Basophils % 1 0 - 2 %    Immature Granulocyte % 4 (H) <=0 %    Neutrophils Absolute 8.8 (H) 2.0 - 7.7 thou/uL    Lymphocytes Absolute 3.4 0.8 - 4.4 thou/uL    Monocytes Absolute 1.9 (H) 0.0 - 0.9 thou/uL    Eosinophils Absolute 0.2 0.0 - 0.4 thou/uL    Basophils Absolute 0.2 0.0 - 0.2 thou/uL    Immature Granulocyte Absolute 0.6 (H) <=0.0 thou/uL     Results for orders placed or performed in visit on 02/12/21   Basic Metabolic Panel   Result Value Ref Range    Sodium 136 136 - 145 mmol/L    Potassium 4.6 3.5 - 5.0 mmol/L    Chloride 105 98 - 107 mmol/L    CO2 25 22 - 31 mmol/L    Anion Gap, Calculation 6 5 - 18 mmol/L    Glucose 102 70 - 125 mg/dL    Calcium 8.9 8.5 - 10.5 mg/dL    BUN 9 8 - 22 mg/dL    Creatinine 0.66 (L) 0.70 - 1.30 mg/dL    GFR MDRD Af Amer >60 >60 mL/min/1.73m2    GFR MDRD Non Af Amer >60 >60 mL/min/1.73m2         Lab Results   Component Value Date    WBC 15.1 (H) 02/12/2021    HGB 10.0 (L) 02/12/2021    HCT 32.1 (L) 02/12/2021    MCV 98 02/12/2021     (H) 02/12/2021       No results found for: [id number]  No results found for: HGBA1C  Lab Results   Component Value Date    INR 0.97 08/20/2012     No results found for: [id number]  No results found for:  TSH        ASSESSMENT/PLAN:    S/p Bilateral transmetatarsal amputation, gastroc tendon lengthening bone biopsy, flap closure: wounds covered today. Dressing cares per surgical team. F/u with [facility] ortho team on 2/15. pain controlled on tylenol, oxycodone, flexeril prn. Changed flexeril to scheduled at bedtime due to muscle cramping. Bmp, hm 1 2/12-stable.   Left proximal phalanx fracture: evaluated by plastic surgeon in hospital and recommended outpatient follow up. F/u in 1-2 weeks in clinic to evaluate for surgery.   Polyneuropathy: on gabapentin. No concerns.   Tobacco abuse: smoking cessation counseling today. Ordered nicotine patches. Reports no cravings.   Anxiety: on hydroxyzine prn, trazodone. Feels controlled today. Discussed with nursing to notify if not controlled.   H/o ETOH abuse, withdrawal seizures: denies any recent withdrawal symptoms. Discussed with nursing to notify for any signs of tremors, tachycardia, diaphoresis, etc.   Insomnia: on melatonin at bedtime, trazodone at bedtime.       Electronically signed by: Felicitas Zhao NP    Total floor/unit time spent 35 min with >50% time spent on counseling and coordination of care. Counseling regarding discharge instructions, pain management. Coordinated care with nursing, therapy,  for discharge planning, home health orders, wheelchair orders, shower bench, walker orders, med orders for discharge, specialty care follow ups.     Please evaluate Nacho Downey for admission to Home Health.    Face to Face Attestation and Initial Plan of Care    The face-to-face encounter occurred on date: 2/12/21  Face to Face encounter was with: Felicitas Zhao    Please provide brief clinical summary of reason for visit and need for home care. Deconditioning after hospital course for bilateral transmetatarsal amputations    Please identify which of the following home health disciplines the patient will need AND describe the skilled services that you would  "like the home health agency to perform: SKILLED NURSING (RN): perform/teach anticoagulation and teach wound care, PHYSICAL THERAPY: strength training and gait training and OCCUPATIONAL THERAPY: ADLs and home safety    Homebound Status (describe the functional limitations that support this patient is confined to his/her home. Medicaid recipients are not required to be homebound.):assistive device needed:  Wheelchair    Name of physician who will be responsible for the ongoing home health plan of care (CMS requires the referring physician to provide the specific name of the community physician instead of a title, such as \"PCP\"): Dio Mast MD    Requested Start of Care Date: Within 48 hours    Other information to assist the home health agency in developing the initial Plan of Care:    I certify that services are/were furnished while this patient was under the care of a physician and that a physician or an allowed non-physician practitioner (NPP), had a face-to-face encounter that meets the physician face-to-face encounter requirements. The encounter was in whole, or in part, related to the primary reason for home health. The patient is confined to his/her home and needs intermittent skilled nursing, physical therapy, speech-language pathology, or the continued need for occupational therapy. A plan of care has been established by a physician and is periodically reviewed by a physician.    Post Discharge Medication Reconciliation Status: discharge medications reconciled, continue medications without change                          "